# Patient Record
Sex: MALE | Race: WHITE | NOT HISPANIC OR LATINO | Employment: FULL TIME | URBAN - METROPOLITAN AREA
[De-identification: names, ages, dates, MRNs, and addresses within clinical notes are randomized per-mention and may not be internally consistent; named-entity substitution may affect disease eponyms.]

---

## 2017-07-10 ENCOUNTER — COMMUNICATION - HEALTHEAST (OUTPATIENT)
Dept: FAMILY MEDICINE | Facility: CLINIC | Age: 52
End: 2017-07-10

## 2017-07-10 DIAGNOSIS — J45.909 ASTHMA: ICD-10-CM

## 2017-07-19 ENCOUNTER — COMMUNICATION - HEALTHEAST (OUTPATIENT)
Dept: TELEHEALTH | Facility: CLINIC | Age: 52
End: 2017-07-19

## 2017-08-14 ENCOUNTER — RECORDS - HEALTHEAST (OUTPATIENT)
Dept: ADMINISTRATIVE | Facility: OTHER | Age: 52
End: 2017-08-14

## 2017-08-15 ENCOUNTER — RECORDS - HEALTHEAST (OUTPATIENT)
Dept: ADMINISTRATIVE | Facility: OTHER | Age: 52
End: 2017-08-15

## 2017-08-22 ENCOUNTER — COMMUNICATION - HEALTHEAST (OUTPATIENT)
Dept: FAMILY MEDICINE | Facility: CLINIC | Age: 52
End: 2017-08-22

## 2017-11-13 ENCOUNTER — OFFICE VISIT - HEALTHEAST (OUTPATIENT)
Dept: FAMILY MEDICINE | Facility: CLINIC | Age: 52
End: 2017-11-13

## 2017-11-13 ENCOUNTER — COMMUNICATION - HEALTHEAST (OUTPATIENT)
Dept: FAMILY MEDICINE | Facility: CLINIC | Age: 52
End: 2017-11-13

## 2017-11-13 DIAGNOSIS — J45.909 ASTHMA: ICD-10-CM

## 2017-11-13 DIAGNOSIS — E78.5 HYPERLIPIDEMIA: ICD-10-CM

## 2017-11-13 DIAGNOSIS — R74.8 ELEVATED LIVER ENZYMES: ICD-10-CM

## 2017-11-13 DIAGNOSIS — Z00.00 HEALTH CARE MAINTENANCE: ICD-10-CM

## 2017-11-13 DIAGNOSIS — Z12.5 SCREENING PSA (PROSTATE SPECIFIC ANTIGEN): ICD-10-CM

## 2017-11-13 DIAGNOSIS — E66.9 OBESITY: ICD-10-CM

## 2017-11-13 LAB
CHOLEST SERPL-MCNC: 164 MG/DL
FASTING STATUS PATIENT QL REPORTED: YES
HDLC SERPL-MCNC: 47 MG/DL
LDLC SERPL CALC-MCNC: 102 MG/DL
PSA SERPL-MCNC: 0.6 NG/ML (ref 0–3.5)
TRIGL SERPL-MCNC: 73 MG/DL

## 2018-04-18 ENCOUNTER — COMMUNICATION - HEALTHEAST (OUTPATIENT)
Dept: FAMILY MEDICINE | Facility: CLINIC | Age: 53
End: 2018-04-18

## 2018-04-18 DIAGNOSIS — J45.909 ASTHMA: ICD-10-CM

## 2018-06-05 ENCOUNTER — COMMUNICATION - HEALTHEAST (OUTPATIENT)
Dept: FAMILY MEDICINE | Facility: CLINIC | Age: 53
End: 2018-06-05

## 2018-11-23 ENCOUNTER — COMMUNICATION - HEALTHEAST (OUTPATIENT)
Dept: FAMILY MEDICINE | Facility: CLINIC | Age: 53
End: 2018-11-23

## 2018-11-23 DIAGNOSIS — E78.5 HYPERLIPIDEMIA: ICD-10-CM

## 2018-11-23 DIAGNOSIS — J45.909 ASTHMA: ICD-10-CM

## 2018-12-07 ENCOUNTER — OFFICE VISIT - HEALTHEAST (OUTPATIENT)
Dept: FAMILY MEDICINE | Facility: CLINIC | Age: 53
End: 2018-12-07

## 2018-12-07 DIAGNOSIS — Z12.5 SCREENING PSA (PROSTATE SPECIFIC ANTIGEN): ICD-10-CM

## 2018-12-07 DIAGNOSIS — R03.0 ELEVATED BP WITHOUT DIAGNOSIS OF HYPERTENSION: ICD-10-CM

## 2018-12-07 DIAGNOSIS — E66.09 CLASS 2 OBESITY DUE TO EXCESS CALORIES WITHOUT SERIOUS COMORBIDITY WITH BODY MASS INDEX (BMI) OF 36.0 TO 36.9 IN ADULT: ICD-10-CM

## 2018-12-07 DIAGNOSIS — E78.5 HYPERLIPIDEMIA: ICD-10-CM

## 2018-12-07 DIAGNOSIS — Z00.00 HEALTH CARE MAINTENANCE: ICD-10-CM

## 2018-12-07 DIAGNOSIS — E66.812 CLASS 2 OBESITY DUE TO EXCESS CALORIES WITHOUT SERIOUS COMORBIDITY WITH BODY MASS INDEX (BMI) OF 36.0 TO 36.9 IN ADULT: ICD-10-CM

## 2018-12-07 DIAGNOSIS — J45.909 ASTHMA: ICD-10-CM

## 2018-12-07 LAB
ALBUMIN SERPL-MCNC: 4.3 G/DL (ref 3.5–5)
ALP SERPL-CCNC: 79 U/L (ref 45–120)
ALT SERPL W P-5'-P-CCNC: 44 U/L (ref 0–45)
ANION GAP SERPL CALCULATED.3IONS-SCNC: 15 MMOL/L (ref 5–18)
AST SERPL W P-5'-P-CCNC: 29 U/L (ref 0–40)
BILIRUB SERPL-MCNC: 0.7 MG/DL (ref 0–1)
BUN SERPL-MCNC: 16 MG/DL (ref 8–22)
CALCIUM SERPL-MCNC: 9.6 MG/DL (ref 8.5–10.5)
CHLORIDE BLD-SCNC: 103 MMOL/L (ref 98–107)
CHOLEST SERPL-MCNC: 209 MG/DL
CO2 SERPL-SCNC: 23 MMOL/L (ref 22–31)
CREAT SERPL-MCNC: 0.8 MG/DL (ref 0.7–1.3)
FASTING STATUS PATIENT QL REPORTED: YES
GFR SERPL CREATININE-BSD FRML MDRD: >60 ML/MIN/1.73M2
GLUCOSE BLD-MCNC: 74 MG/DL (ref 70–125)
HDLC SERPL-MCNC: 49 MG/DL
LDLC SERPL CALC-MCNC: 140 MG/DL
POTASSIUM BLD-SCNC: 4.3 MMOL/L (ref 3.5–5)
PROT SERPL-MCNC: 7.8 G/DL (ref 6–8)
SODIUM SERPL-SCNC: 141 MMOL/L (ref 136–145)
TRIGL SERPL-MCNC: 99 MG/DL

## 2018-12-10 ENCOUNTER — COMMUNICATION - HEALTHEAST (OUTPATIENT)
Dept: FAMILY MEDICINE | Facility: CLINIC | Age: 53
End: 2018-12-10

## 2018-12-21 ENCOUNTER — AMBULATORY - HEALTHEAST (OUTPATIENT)
Dept: NURSING | Facility: CLINIC | Age: 53
End: 2018-12-21

## 2018-12-21 ENCOUNTER — AMBULATORY - HEALTHEAST (OUTPATIENT)
Dept: LAB | Facility: CLINIC | Age: 53
End: 2018-12-21

## 2018-12-21 DIAGNOSIS — R03.0 ELEVATED BLOOD PRESSURE READING WITHOUT DIAGNOSIS OF HYPERTENSION: ICD-10-CM

## 2018-12-21 DIAGNOSIS — Z12.5 SCREENING PSA (PROSTATE SPECIFIC ANTIGEN): ICD-10-CM

## 2018-12-21 LAB — PSA SERPL-MCNC: 0.7 NG/ML (ref 0–3.5)

## 2018-12-22 ENCOUNTER — COMMUNICATION - HEALTHEAST (OUTPATIENT)
Dept: FAMILY MEDICINE | Facility: CLINIC | Age: 53
End: 2018-12-22

## 2019-01-09 ENCOUNTER — OFFICE VISIT - HEALTHEAST (OUTPATIENT)
Dept: FAMILY MEDICINE | Facility: CLINIC | Age: 54
End: 2019-01-09

## 2019-01-09 DIAGNOSIS — I10 ESSENTIAL HYPERTENSION: ICD-10-CM

## 2019-01-18 ENCOUNTER — AMBULATORY - HEALTHEAST (OUTPATIENT)
Dept: NURSING | Facility: CLINIC | Age: 54
End: 2019-01-18

## 2019-02-06 ENCOUNTER — OFFICE VISIT - HEALTHEAST (OUTPATIENT)
Dept: FAMILY MEDICINE | Facility: CLINIC | Age: 54
End: 2019-02-06

## 2019-02-06 DIAGNOSIS — I10 ESSENTIAL HYPERTENSION: ICD-10-CM

## 2019-02-06 LAB
ANION GAP SERPL CALCULATED.3IONS-SCNC: 12 MMOL/L (ref 5–18)
BUN SERPL-MCNC: 17 MG/DL (ref 8–22)
CALCIUM SERPL-MCNC: 9 MG/DL (ref 8.5–10.5)
CHLORIDE BLD-SCNC: 103 MMOL/L (ref 98–107)
CO2 SERPL-SCNC: 24 MMOL/L (ref 22–31)
CREAT SERPL-MCNC: 0.83 MG/DL (ref 0.7–1.3)
GFR SERPL CREATININE-BSD FRML MDRD: >60 ML/MIN/1.73M2
GLUCOSE BLD-MCNC: 86 MG/DL (ref 70–125)
POTASSIUM BLD-SCNC: 4.1 MMOL/L (ref 3.5–5)
SODIUM SERPL-SCNC: 139 MMOL/L (ref 136–145)

## 2019-02-06 ASSESSMENT — MIFFLIN-ST. JEOR: SCORE: 2083.64

## 2019-02-07 ENCOUNTER — COMMUNICATION - HEALTHEAST (OUTPATIENT)
Dept: FAMILY MEDICINE | Facility: CLINIC | Age: 54
End: 2019-02-07

## 2019-02-20 ENCOUNTER — AMBULATORY - HEALTHEAST (OUTPATIENT)
Dept: NURSING | Facility: CLINIC | Age: 54
End: 2019-02-20

## 2019-02-20 DIAGNOSIS — I10 ESSENTIAL HYPERTENSION: ICD-10-CM

## 2019-02-27 ENCOUNTER — OFFICE VISIT - HEALTHEAST (OUTPATIENT)
Dept: FAMILY MEDICINE | Facility: CLINIC | Age: 54
End: 2019-02-27

## 2019-02-27 DIAGNOSIS — I10 ESSENTIAL HYPERTENSION: ICD-10-CM

## 2019-02-27 LAB
ANION GAP SERPL CALCULATED.3IONS-SCNC: 12 MMOL/L (ref 5–18)
BUN SERPL-MCNC: 17 MG/DL (ref 8–22)
CALCIUM SERPL-MCNC: 9.4 MG/DL (ref 8.5–10.5)
CHLORIDE BLD-SCNC: 100 MMOL/L (ref 98–107)
CO2 SERPL-SCNC: 27 MMOL/L (ref 22–31)
CREAT SERPL-MCNC: 0.85 MG/DL (ref 0.7–1.3)
GFR SERPL CREATININE-BSD FRML MDRD: >60 ML/MIN/1.73M2
GLUCOSE BLD-MCNC: 92 MG/DL (ref 70–125)
POTASSIUM BLD-SCNC: 4.4 MMOL/L (ref 3.5–5)
SODIUM SERPL-SCNC: 139 MMOL/L (ref 136–145)

## 2019-02-28 ENCOUNTER — COMMUNICATION - HEALTHEAST (OUTPATIENT)
Dept: FAMILY MEDICINE | Facility: CLINIC | Age: 54
End: 2019-02-28

## 2019-03-18 ENCOUNTER — COMMUNICATION - HEALTHEAST (OUTPATIENT)
Dept: FAMILY MEDICINE | Facility: CLINIC | Age: 54
End: 2019-03-18

## 2019-03-22 ENCOUNTER — AMBULATORY - HEALTHEAST (OUTPATIENT)
Dept: NURSING | Facility: CLINIC | Age: 54
End: 2019-03-22

## 2019-03-22 DIAGNOSIS — I10 ESSENTIAL HYPERTENSION: ICD-10-CM

## 2019-04-01 ENCOUNTER — OFFICE VISIT - HEALTHEAST (OUTPATIENT)
Dept: FAMILY MEDICINE | Facility: CLINIC | Age: 54
End: 2019-04-01

## 2019-04-01 DIAGNOSIS — I10 ESSENTIAL HYPERTENSION: ICD-10-CM

## 2019-04-01 DIAGNOSIS — T63.441D BEE STING REACTION, ACCIDENTAL OR UNINTENTIONAL, SUBSEQUENT ENCOUNTER: ICD-10-CM

## 2019-04-01 DIAGNOSIS — E56.9 VITAMIN DEFICIENCY: ICD-10-CM

## 2019-04-01 LAB
ANION GAP SERPL CALCULATED.3IONS-SCNC: 14 MMOL/L (ref 5–18)
BUN SERPL-MCNC: 24 MG/DL (ref 8–22)
CALCIUM SERPL-MCNC: 9.2 MG/DL (ref 8.5–10.5)
CHLORIDE BLD-SCNC: 102 MMOL/L (ref 98–107)
CO2 SERPL-SCNC: 22 MMOL/L (ref 22–31)
CREAT SERPL-MCNC: 0.86 MG/DL (ref 0.7–1.3)
GFR SERPL CREATININE-BSD FRML MDRD: >60 ML/MIN/1.73M2
GLUCOSE BLD-MCNC: 146 MG/DL (ref 70–125)
POTASSIUM BLD-SCNC: 4 MMOL/L (ref 3.5–5)
SODIUM SERPL-SCNC: 138 MMOL/L (ref 136–145)

## 2019-04-01 RX ORDER — EPINEPHRINE 0.3 MG/.3ML
0.3 INJECTION SUBCUTANEOUS PRN
Qty: 2 | Refills: 0 | Status: SHIPPED | OUTPATIENT
Start: 2019-04-01 | End: 2024-01-30

## 2019-04-02 ENCOUNTER — AMBULATORY - HEALTHEAST (OUTPATIENT)
Dept: FAMILY MEDICINE | Facility: CLINIC | Age: 54
End: 2019-04-02

## 2019-04-02 ENCOUNTER — COMMUNICATION - HEALTHEAST (OUTPATIENT)
Dept: FAMILY MEDICINE | Facility: CLINIC | Age: 54
End: 2019-04-02

## 2019-04-02 DIAGNOSIS — E55.9 VITAMIN D DEFICIENCY: ICD-10-CM

## 2019-04-02 LAB
25(OH)D3 SERPL-MCNC: 6 NG/ML (ref 30–80)
25(OH)D3 SERPL-MCNC: 6 NG/ML (ref 30–80)

## 2019-05-20 ENCOUNTER — COMMUNICATION - HEALTHEAST (OUTPATIENT)
Dept: FAMILY MEDICINE | Facility: CLINIC | Age: 54
End: 2019-05-20

## 2019-05-20 DIAGNOSIS — I10 ESSENTIAL HYPERTENSION: ICD-10-CM

## 2019-06-06 ENCOUNTER — COMMUNICATION - HEALTHEAST (OUTPATIENT)
Dept: SCHEDULING | Facility: CLINIC | Age: 54
End: 2019-06-06

## 2019-07-24 ENCOUNTER — AMBULATORY - HEALTHEAST (OUTPATIENT)
Dept: LAB | Facility: CLINIC | Age: 54
End: 2019-07-24

## 2019-07-24 DIAGNOSIS — E55.9 VITAMIN D DEFICIENCY: ICD-10-CM

## 2019-07-25 ENCOUNTER — COMMUNICATION - HEALTHEAST (OUTPATIENT)
Dept: FAMILY MEDICINE | Facility: CLINIC | Age: 54
End: 2019-07-25

## 2019-07-25 LAB
25(OH)D3 SERPL-MCNC: 39.9 NG/ML (ref 30–80)
25(OH)D3 SERPL-MCNC: 39.9 NG/ML (ref 30–80)

## 2019-09-12 ENCOUNTER — COMMUNICATION - HEALTHEAST (OUTPATIENT)
Dept: FAMILY MEDICINE | Facility: CLINIC | Age: 54
End: 2019-09-12

## 2019-09-12 DIAGNOSIS — J45.909 ASTHMA: ICD-10-CM

## 2019-10-30 ENCOUNTER — COMMUNICATION - HEALTHEAST (OUTPATIENT)
Dept: FAMILY MEDICINE | Facility: CLINIC | Age: 54
End: 2019-10-30

## 2019-10-30 DIAGNOSIS — I10 ESSENTIAL HYPERTENSION: ICD-10-CM

## 2019-11-22 ENCOUNTER — OFFICE VISIT - HEALTHEAST (OUTPATIENT)
Dept: FAMILY MEDICINE | Facility: CLINIC | Age: 54
End: 2019-11-22

## 2019-11-22 DIAGNOSIS — E78.5 HYPERLIPIDEMIA: ICD-10-CM

## 2019-11-22 DIAGNOSIS — I10 ESSENTIAL HYPERTENSION: ICD-10-CM

## 2019-11-22 DIAGNOSIS — Z12.5 SCREENING PSA (PROSTATE SPECIFIC ANTIGEN): ICD-10-CM

## 2019-11-22 DIAGNOSIS — E66.812 CLASS 2 DRUG-INDUCED OBESITY WITHOUT SERIOUS COMORBIDITY WITH BODY MASS INDEX (BMI) OF 35.0 TO 35.9 IN ADULT: ICD-10-CM

## 2019-11-22 DIAGNOSIS — J45.31 MILD PERSISTENT ASTHMA WITH EXACERBATION: ICD-10-CM

## 2019-11-22 DIAGNOSIS — E66.1 CLASS 2 DRUG-INDUCED OBESITY WITHOUT SERIOUS COMORBIDITY WITH BODY MASS INDEX (BMI) OF 35.0 TO 35.9 IN ADULT: ICD-10-CM

## 2019-11-22 DIAGNOSIS — Z23 NEED FOR SHINGLES VACCINE: ICD-10-CM

## 2019-11-22 DIAGNOSIS — E55.9 VITAMIN D DEFICIENCY: ICD-10-CM

## 2019-11-22 DIAGNOSIS — R73.9 ELEVATED BLOOD SUGAR: ICD-10-CM

## 2019-11-22 LAB
ALBUMIN SERPL-MCNC: 4.4 G/DL (ref 3.5–5)
ALP SERPL-CCNC: 68 U/L (ref 45–120)
ALT SERPL W P-5'-P-CCNC: 56 U/L (ref 0–45)
ANION GAP SERPL CALCULATED.3IONS-SCNC: 14 MMOL/L (ref 5–18)
AST SERPL W P-5'-P-CCNC: 30 U/L (ref 0–40)
BILIRUB SERPL-MCNC: 0.7 MG/DL (ref 0–1)
BUN SERPL-MCNC: 18 MG/DL (ref 8–22)
CALCIUM SERPL-MCNC: 9.7 MG/DL (ref 8.5–10.5)
CHLORIDE BLD-SCNC: 99 MMOL/L (ref 98–107)
CHOLEST SERPL-MCNC: 199 MG/DL
CO2 SERPL-SCNC: 25 MMOL/L (ref 22–31)
CREAT SERPL-MCNC: 0.86 MG/DL (ref 0.7–1.3)
FASTING STATUS PATIENT QL REPORTED: YES
GFR SERPL CREATININE-BSD FRML MDRD: >60 ML/MIN/1.73M2
GLUCOSE BLD-MCNC: 85 MG/DL (ref 70–125)
HBA1C MFR BLD: 5.7 % (ref 3.5–6)
HDLC SERPL-MCNC: 47 MG/DL
LDLC SERPL CALC-MCNC: 124 MG/DL
POTASSIUM BLD-SCNC: 4.1 MMOL/L (ref 3.5–5)
PROT SERPL-MCNC: 8.1 G/DL (ref 6–8)
PSA SERPL-MCNC: 0.6 NG/ML (ref 0–3.5)
SODIUM SERPL-SCNC: 138 MMOL/L (ref 136–145)
TRIGL SERPL-MCNC: 142 MG/DL

## 2019-11-22 ASSESSMENT — MIFFLIN-ST. JEOR: SCORE: 2077.86

## 2019-11-25 LAB
25(OH)D3 SERPL-MCNC: 44.9 NG/ML (ref 30–80)
25(OH)D3 SERPL-MCNC: 44.9 NG/ML (ref 30–80)

## 2019-11-28 ENCOUNTER — COMMUNICATION - HEALTHEAST (OUTPATIENT)
Dept: FAMILY MEDICINE | Facility: CLINIC | Age: 54
End: 2019-11-28

## 2019-12-10 ENCOUNTER — AMBULATORY - HEALTHEAST (OUTPATIENT)
Dept: NURSING | Facility: CLINIC | Age: 54
End: 2019-12-10

## 2019-12-10 DIAGNOSIS — I10 ESSENTIAL HYPERTENSION: ICD-10-CM

## 2019-12-18 ENCOUNTER — COMMUNICATION - HEALTHEAST (OUTPATIENT)
Dept: FAMILY MEDICINE | Facility: CLINIC | Age: 54
End: 2019-12-18

## 2020-01-06 ENCOUNTER — OFFICE VISIT - HEALTHEAST (OUTPATIENT)
Dept: FAMILY MEDICINE | Facility: CLINIC | Age: 55
End: 2020-01-06

## 2020-01-06 DIAGNOSIS — E78.5 HYPERLIPIDEMIA, UNSPECIFIED HYPERLIPIDEMIA TYPE: ICD-10-CM

## 2020-01-06 DIAGNOSIS — I10 ESSENTIAL HYPERTENSION: ICD-10-CM

## 2020-01-06 DIAGNOSIS — I21.A9 OTHER TYPE OF MYOCARDIAL INFARCTION (H): ICD-10-CM

## 2020-01-06 LAB
ANION GAP SERPL CALCULATED.3IONS-SCNC: 13 MMOL/L (ref 5–18)
BUN SERPL-MCNC: 24 MG/DL (ref 8–22)
CALCIUM SERPL-MCNC: 10 MG/DL (ref 8.5–10.5)
CHLORIDE BLD-SCNC: 100 MMOL/L (ref 98–107)
CO2 SERPL-SCNC: 23 MMOL/L (ref 22–31)
CREAT SERPL-MCNC: 0.87 MG/DL (ref 0.7–1.3)
GFR SERPL CREATININE-BSD FRML MDRD: >60 ML/MIN/1.73M2
GLUCOSE BLD-MCNC: 89 MG/DL (ref 70–125)
POTASSIUM BLD-SCNC: 4 MMOL/L (ref 3.5–5)
SODIUM SERPL-SCNC: 136 MMOL/L (ref 136–145)

## 2020-01-07 ENCOUNTER — COMMUNICATION - HEALTHEAST (OUTPATIENT)
Dept: FAMILY MEDICINE | Facility: CLINIC | Age: 55
End: 2020-01-07

## 2020-01-24 ENCOUNTER — AMBULATORY - HEALTHEAST (OUTPATIENT)
Dept: NURSING | Facility: CLINIC | Age: 55
End: 2020-01-24

## 2020-02-19 ENCOUNTER — COMMUNICATION - HEALTHEAST (OUTPATIENT)
Dept: FAMILY MEDICINE | Facility: CLINIC | Age: 55
End: 2020-02-19

## 2020-02-26 ENCOUNTER — OFFICE VISIT - HEALTHEAST (OUTPATIENT)
Dept: FAMILY MEDICINE | Facility: CLINIC | Age: 55
End: 2020-02-26

## 2020-02-26 ENCOUNTER — COMMUNICATION - HEALTHEAST (OUTPATIENT)
Dept: FAMILY MEDICINE | Facility: CLINIC | Age: 55
End: 2020-02-26

## 2020-02-26 DIAGNOSIS — M10.9 ACUTE GOUT INVOLVING TOE OF RIGHT FOOT, UNSPECIFIED CAUSE: ICD-10-CM

## 2020-02-26 DIAGNOSIS — I10 ESSENTIAL HYPERTENSION: ICD-10-CM

## 2020-02-26 DIAGNOSIS — J06.9 VIRAL UPPER RESPIRATORY TRACT INFECTION: ICD-10-CM

## 2020-02-26 LAB — URATE SERPL-MCNC: 7.9 MG/DL (ref 3–8)

## 2020-03-13 ENCOUNTER — AMBULATORY - HEALTHEAST (OUTPATIENT)
Dept: NURSING | Facility: CLINIC | Age: 55
End: 2020-03-13

## 2020-03-25 ENCOUNTER — COMMUNICATION - HEALTHEAST (OUTPATIENT)
Dept: FAMILY MEDICINE | Facility: CLINIC | Age: 55
End: 2020-03-25

## 2020-03-25 DIAGNOSIS — J45.909 ASTHMA: ICD-10-CM

## 2020-05-12 ENCOUNTER — COMMUNICATION - HEALTHEAST (OUTPATIENT)
Dept: FAMILY MEDICINE | Facility: CLINIC | Age: 55
End: 2020-05-12

## 2020-05-13 ENCOUNTER — AMBULATORY - HEALTHEAST (OUTPATIENT)
Dept: FAMILY MEDICINE | Facility: CLINIC | Age: 55
End: 2020-05-13

## 2020-05-13 DIAGNOSIS — Z71.84 COUNSELING FOR TRAVEL: ICD-10-CM

## 2020-05-13 DIAGNOSIS — Z00.00 HEALTH CARE MAINTENANCE: ICD-10-CM

## 2020-06-17 ENCOUNTER — VIRTUAL VISIT (OUTPATIENT)
Dept: FAMILY MEDICINE | Facility: OTHER | Age: 55
End: 2020-06-17

## 2020-06-17 DIAGNOSIS — Z20.822 SUSPECTED 2019 NOVEL CORONAVIRUS INFECTION: Primary | ICD-10-CM

## 2020-06-17 PROCEDURE — U0003 INFECTIOUS AGENT DETECTION BY NUCLEIC ACID (DNA OR RNA); SEVERE ACUTE RESPIRATORY SYNDROME CORONAVIRUS 2 (SARS-COV-2) (CORONAVIRUS DISEASE [COVID-19]), AMPLIFIED PROBE TECHNIQUE, MAKING USE OF HIGH THROUGHPUT TECHNOLOGIES AS DESCRIBED BY CMS-2020-01-R: HCPCS | Mod: 90 | Performed by: FAMILY MEDICINE

## 2020-06-17 PROCEDURE — 99000 SPECIMEN HANDLING OFFICE-LAB: CPT | Performed by: FAMILY MEDICINE

## 2020-06-17 NOTE — LETTER
June 18, 2020        Ulices Gilbert  [address]  [place] [facility] [address]    This letter provides a written record that you were tested for COVID-19 on 6/17/20.       Your result was negative. This means that we didn t find the virus that causes COVID-19 in your sample. A test may show negative when you do actually have the virus. This can happen when the virus is in the early stages of infection, before you feel illness symptoms.    If you have symptoms   Stay home and away from others (self-isolate) until you meet ALL of the guidelines below:    You ve had no fever--and no medicine that reduces fever--for 3 full days (72 hours). And      Your other symptoms have gotten better. For example, your cough or breathing has improved. And     At least 10 days have passed since your symptoms started.    During this time:    Stay home. Don t go to work, school or anywhere else.     Stay in your own room, including for meals. Use your own bathroom if you can.    Stay away from others in your home. No hugging, kissing or shaking hands. No visitors.    Clean  high touch  surfaces often (doorknobs, counters, handles, etc.). Use a household cleaning spray or wipes. You can find a full list on the EPA website at www.epa.gov/pesticide-registration/list-n-disinfectants-use-against-sars-cov-2.    Cover your mouth and nose with a mask, tissue or washcloth to avoid spreading germs.    Wash your hands and face often with soap and water.    Going back to work  Check with your employer for any guidelines to follow for going back to work.    Employers: This document serves as formal notice that your employee tested negative for COVID-19, as of the testing date shown above.

## 2020-06-18 LAB
SARS-COV-2 RNA SPEC QL NAA+PROBE: NOT DETECTED
SPECIMEN SOURCE: NORMAL

## 2020-06-18 NOTE — PROGRESS NOTES
"Date: 2020 10:18:28  Clinician: Jessica Ash  Clinician NPI: [id number]  Patient: Ulices Gilbert  Patient : 1965  Patient Address: [address] [place], [place] [address]  Patient Phone: [phone]  Visit Protocol: URI  Patient Summary:  Ulices is a 55 year old ( : 1965 ) male who initiated a Visit for COVID-19 (Coronavirus) evaluation and screening. When asked the question \"Please sign me up to receive news, health information and promotions from [employer].\", Ulices responded \"No\".    Ulices states his symptoms started today.   His symptoms consist of nausea, diarrhea, a sore throat, enlarged lymph nodes, malaise, myalgia, a cough, and chills.   Symptom details     Cough: Ulices coughs every 5-10 minutes and his cough is more bothersome at night. Phlegm does not come into his throat when he coughs. He does not believe his cough is caused by post-nasal drip.     Sore throat: Ulices reports having severe throat pain (7-9 on a 10 point pain scale), does not have exudate on his tonsils, and can swallow liquids. The lymph nodes in his neck are enlarged. A rash has not appeared on the skin since the sore throat started.      Ulices denies having wheezing, teeth pain, ageusia, anosmia, facial pain or pressure, fever, nasal congestion, vomiting, rhinitis, ear pain, and headache. He also denies having recent facial or sinus surgery in the past 60 days and taking antibiotic medication for the symptoms.   Precipitating events  Within the past week, Ulices has not been exposed to someone with strep throat. He has not recently been exposed to someone with influenza. Ulices has not been in close contact with any high risk individuals.   Pertinent COVID-19 (Coronavirus) information  In the past 14 days, Ulices has not worked in a congregate living setting.   He does not work or volunteer as healthcare worker or a  and does not work or volunteer in a healthcare facility.   Ulices also has not lived in a " congregate living setting in the past 14 days. He does not live with a healthcare worker.   Ulices has not had a close contact with a laboratory-confirmed COVID-19 patient within 14 days of symptom onset.   Triage Point(s) temporarily suspended for COVID-19 (Coronavirus) screening  Ulices reported the following symptoms which were previously protocol referral points. These protocol referral points have temporarily been removed for purposes of COVID-19 (Coronavirus) screening.   Difficulty breathing even when resting and can only speak in phrase(s)   Pertinent medical history  Ulices does not need a return to work/school note.   Weight: 237 lbs   Ulices does not smoke or use smokeless tobacco.   Additional information as reported by the patient (free text): at protest last Saturday   Weight: 237 lbs    MEDICATIONS: simvastatin oral, lisinopril oral, Pulmicort Flexhaler inhalation, ALLERGIES: NKDA  Clinician Response:  Dear Ulices,   Your symptoms show that you may have coronavirus (COVID-19). This illness can cause fever, cough and trouble breathing. Many people get a mild case and get better on their own. Some people can get very sick.  Based on the symptoms you have shared, I would like you to be re-checked in 2 to 3 days. Please call your family clinic to set up a video or phone visit.  Will I be tested for COVID-19?  We would like to test you for this virus.   Please call [phone] to schedule your visit. Explain that you were referred by [facility] to have a COVID-19 test. Be ready to [facility].   The following will serve as your written order for this COVID Test, ordered by me, for the indication of suspected COVID [Z20.828]: The test will be ordered in [employer], our electronic health record, after you are scheduled. It will show as ordered and authorized by Cecilio Moreno MD.  Order: COVID-19 (Coronavirus) PCR for SYMPTOMATIC testing from [facility].  1.When it's time for your COVID test:   Stay at least 6  "feet away from others. (If someone will drive you to your test, stay in the backseat, as far away from the  as you can.)   Cover your mouth and nose with a mask, tissue or washcloth.  Go straight to the testing site. Don't make any stops on the way there or back.      2.Starting now: Stay home and away from others (self-isolate) until:   You've had no fever---and no medicine that reduces fever---for 3 full days (72 hours). And...   Your other symptoms have gotten better. For example, your cough or breathing has improved. And...   At least 10 days have passed since your symptoms started.       During this time, don't leave the house except for testing or medical care.   Stay in your own room, even for meals. Use your own bathroom if you can.   Stay away from others in your home. No hugging, kissing or shaking hands. No visitors.  Don't go to work, school or anywhere else.    Clean \"high touch\" surfaces often (doorknobs, counters, handles, etc.). Use a household cleaning spray or wipes. You'll find a full list of  on the EPA website: www.epa.gov/pesticide-registration/list-n-disinfectants-use-against-sars-cov-2.   Cover your mouth and nose with a mask, tissue or washcloth to avoid spreading germs.  Wash your hands and face often. Use soap and water.  Caregivers in these groups are at risk for severe illness due to COVID-19:  o People 65 years and older  o People who live in a nursing home or long-term care facility  o People with chronic disease (lung, heart, cancer, diabetes, kidney, liver, immunologic)   o People who have a weakened immune system, including those who:   Are in cancer treatment  Take medicine that weakens the immune system, such as corticosteroids  Had a bone marrow or organ transplant  Have an immune deficiency  Have poorly controlled HIV or AIDS  Are obese (body mass index of 40 or higher)  Smoke regularly   o Caregivers should wear gloves while washing dishes, handling laundry and " cleaning bedrooms and bathrooms.  o Use caution when washing and drying laundry: Don't shake dirty laundry, and use the warmest water setting that you can.  o For more tips, go to www.cdc.gov/coronavirus/2019-ncov/downloads/10Things.pdf.      How can I take care of myself?   Get lots of rest. Drink extra fluids (unless a doctor has told you not to)   Take Tylenol (acetaminophen) for fever or pain. If you have liver or kidney problems, ask your family doctor if it's okay to take Tylenol.   Adults can take either:    650 mg (two 325 mg pills) every 4 to 6 hours, or...   1,000 mg (two 500 mg pills) every 8 hours as needed.    Note: Don't take more than 3,000 mg in one day. Acetaminophen is found in many medicines (both prescribed and over-the-counter medicines). Read all labels to be sure you don't take too much.   For children, check the Tylenol bottle for the right dose. The dose is based on the child's age or weight.    If you have other health problems (like cancer, heart failure, an organ transplant or severe kidney disease): Call your specialty clinic if you don't feel better in the next 2 days.       Know when to call 911. Emergency warning signs include:    Trouble breathing or shortness of breath Pain or pressure in the chest that doesn't go away Feeling confused like you haven't felt before, or not being able to wake up Bluish-colored lips or face  Where can I get more information?   [facility] -- About COVID-19: [email or web address]   CDC -- What to Do If You're Sick: www.cdc.gov/coronavirus/2019-ncov/about/steps-when-sick.html   CDC -- Ending Home Isolation: www.cdc.gov/coronavirus/2019-ncov/hcp/disposition-in-home-patients.html   CDC -- Caring for Someone: www.cdc.gov/coronavirus/2019-ncov/if-you-are-sick/care-for-someone.html   [facility] -- Interim Guidance for Hospital Discharge to Home: www.health.[facility].mn./diseases/coronavirus/hcp/hospdischarge.pdf   [facility]  trials (COVID-19 research studies): clinicalaffairs.[facility]/n-clinical-trials    Below are the COVID-19 hotlines at the [place] Department of Health ([facility]). Interpreters are available.    For health questions: Call 804-507-8449 or 1-259.129.6321 (7 a.m. to 7 p.m.) For questions about schools and childcare: Call 314-867-5045 or 1-568.807.4889 (7 a.m. to 7 p.m.)       Diagnosis: Cough  Diagnosis ICD: R05  Prescription: benzonatate (Tessalon Perles) 100 mg oral capsule 21 capsule, 7 days supply. Take 1 capsule by mouth 3 times per day for 7 days as needed. Refills: 0, Refill as needed: no, Allow substitutions: yes

## 2020-07-21 ENCOUNTER — NURSE TRIAGE (OUTPATIENT)
Dept: NURSING | Facility: CLINIC | Age: 55
End: 2020-07-21

## 2020-07-21 DIAGNOSIS — Z20.822 EXPOSURE TO COVID-19 VIRUS: Primary | ICD-10-CM

## 2020-07-21 NOTE — TELEPHONE ENCOUNTER
"Patient is calling requesting COVID serologic antibody testing.  NOTE: Serologic testing is a blood test for 'antibodies' which are made at 10-14 days after you have had symptoms of COVID or were exposed and had an asymptomatic infection.  This does NOT test you for 'active' infection or tell you if you are contagious.    Are you a healthcare worker? no  Do you currently have a cough, fever, body aches, shortness of breath, or difficulty breathing?  No  Did you previously have cough, fever, body aches, shortness of breath, or difficulty breathing that have now resolved? Has had previous covid symptoms.   Symptoms began 100 days ago.  Symptoms started > 14 days ago. Lab order placed per SARS-CoV-2 Serology test Standing Order using indication \"Previously symptomatic >14d since onset, currently asymptomatic\" and diagnosis code \"Screening for viral disease\" (Z11.59)      The patient was informed: \"Testing is limited each day and it may take time for testing to be available to everyone who has called. You will receive a call within 48-72 hours to schedule the serology testing. Please confirm the best number to reach you is [phone]. If you have any questions about scheduling, call [phone].\"     Gloria Taylor RN on 7/21/2020 at 2:16 PM      "

## 2020-07-29 ENCOUNTER — VIRTUAL VISIT (OUTPATIENT)
Dept: FAMILY MEDICINE | Facility: OTHER | Age: 55
End: 2020-07-29
Payer: COMMERCIAL

## 2020-07-29 PROCEDURE — 99421 OL DIG E/M SVC 5-10 MIN: CPT | Performed by: FAMILY MEDICINE

## 2020-07-29 NOTE — PROGRESS NOTES
"Date: 2020 17:48:28  Clinician: Cristopher Brink  Clinician NPI: [id number]  Patient: Ulices Gilbert  Patient : 1965  Patient Address: [address] [place], [place] [address]  Patient Phone: [phone]  Visit Protocol: URI  Patient Summary:  Ulices is a 55 year old ( : 1965 ) male who initiated a Visit for COVID-19 (Coronavirus) evaluation and screening. When asked the question \"Please sign me up to receive news, health information and promotions from [facility].\", Ulices responded \"No\".    When asked when his symptoms started, Ulices reported that he does not have any symptoms.   He denies having recent facial or sinus surgery in the past 60 days and taking antibiotic medication in the past month.    Pertinent COVID-19 (Coronavirus) information  In the past 14 days, Ulices has not worked in a congregate living setting.   He does not work or volunteer as healthcare worker or a  and does not work or volunteer in a healthcare facility.   Ulices also has not lived in a congregate living setting in the past 14 days. He does not live with a healthcare worker.   Ulices has had a close contact with a laboratory-confirmed COVID-19 patient in the last 14 days. Additional information about contact with COVID-19 (Coronavirus) patient as reported by the patient (free text): At work we have one tested positive several others showing symptoms.  [employer] is closed, everyone has been instructed to get tested.   Pertinent medical history  Ulicse does not need a return to work/school note.   Weight: 237 lbs   Ulices does not smoke or use smokeless tobacco.   Weight: 237 lbs    MEDICATIONS: Pulmicort Flexhaler inhalation, simvastatin oral, lisinopril oral, ALLERGIES: NKDA  Clinician Response:  Dear Ulices,   Based on your exposure to COVID-19 (coronavirus), we would like to test you for this virus.  1. Please call [phone] to schedule your visit. [facility]ain [facility]. Be ready " to share your [facility] visit ID number.  The following will serve as your written order for this COVID Test, ordered by me, for the indication of suspected COVID [Z20.828]: The test will be ordered in [employer], our electronic health record, after you are scheduled. It will show as ordered and authorized by Cecilio Moreno MD.  Order: COVID-19 (coronavirus) PCR for [facility]TOMATIC EXPOSURE [facility].  If you know you have had close contact with someone who tested positive, you should be quarantined for 14 days after this exposure. You should stay in quarantine for the14 days even if the covid test is negative, the optimal time to test after exposure is 5-7 days from the exposure  Quarantine means   What should I do?  For safety, it's very important to follow these rules. Do this for 14 days after the date you were last exposed to the virus..  Stay home and away from others. Don't go to school or anywhere else. Generally quarantine means staying home for work but there are some exceptions to this. Please contact your workplace.   No hugging, kissing or shaking hands.  Don't let anyone visit.  Cover your mouth and nose with a mask, tissue or washcloth to avoid spreading germs.  Wash your hands and face often. Use soap and water.  What are the symptoms of COVID-19?  The most common symptoms are cough, fever and trouble breathing. Less common symptoms include headache, body aches, fatigue (feeling very tired), chills, sore throat, stuffy or runny nose, diarrhea (loose poop), loss of taste or smell, belly pain, and nausea or vomiting (feeling sick to your stomach or throwing up).  After 14 days, if you have still don't have symptoms, you likely don't have this virus.  If you develop symptoms, follow these guidelines.  If you're normally healthy: Please start another [facility] visit to report your symptoms. Go to [email or web address].  If you have a serious health problem (like cancer, heart failure, an organ transplant or kidney  disease): Call your specialty clinic. Let them know that you might have COVID-19.  2. When it's time for your COVID test:  Stay at least 6 feet away from others. (If someone will drive you to your test, stay in the backseat, as far away from the  as you can.)  Cover your mouth and nose with a mask, tissue or washcloth.  Go straight to the testing site. Don't make any stops on the way there or back.  Please note  Caregivers in these groups are at risk for severe illness due to COVID-19:  o People 65 years and older  o People who live in a nursing home or long-term care facility  o People with chronic disease (lung, heart, cancer, diabetes, kidney, liver, immunologic)  o People who have a weakened immune system, including those who:  Are in cancer treatment  Take medicine that weakens the immune system, such as corticosteroids  Had a bone marrow or organ transplant  Have an immune deficiency  Have poorly controlled HIV or AIDS  Are obese (body mass index of 40 or higher)  Smoke regularly  Where can I get more information?  [facility] -- About COVID-19: [email or web address]  CDC -- What to Do If You're Sick: www.cdc.gov/coronavirus/2019-ncov/about/steps-when-sick.html  CDC -- Ending Home Isolation: www.cdc.gov/coronavirus/2019-ncov/hcp/disposition-in-home-patients.html  [facility] -- Caring for Someone: www.cdc.gov/coronavirus/2019-ncov/if-you-are-sick/care-for-someone.html  [facility] -- Interim Guidance for Hospital Discharge to Home: www.health.[facility].mn.us/diseases/coronavirus/hcp/hospdischarge.pdf  [facility] clinical trials (COVID-19 research studies): clinicalaffairs.[facility]-clinical-trials  Below are the COVID-19 hotlines at the [place] Department of Health ([facility]). Interpreters are available.  For health questions: Call 015-926-0897 or 1-513.391.2306 (7 a.m. to 7 p.m.)  For questions about schools and childcare: Call 341-849-6440 or 1-658.459.1147 (7 a.m. to 7 p.m.)    Diagnosis:  Cough  Diagnosis ICD: R05

## 2020-07-30 DIAGNOSIS — Z20.822 EXPOSURE TO COVID-19 VIRUS: Primary | ICD-10-CM

## 2020-07-30 PROCEDURE — U0003 INFECTIOUS AGENT DETECTION BY NUCLEIC ACID (DNA OR RNA); SEVERE ACUTE RESPIRATORY SYNDROME CORONAVIRUS 2 (SARS-COV-2) (CORONAVIRUS DISEASE [COVID-19]), AMPLIFIED PROBE TECHNIQUE, MAKING USE OF HIGH THROUGHPUT TECHNOLOGIES AS DESCRIBED BY CMS-2020-01-R: HCPCS | Performed by: FAMILY MEDICINE

## 2020-07-31 LAB
SARS-COV-2 RNA SPEC QL NAA+PROBE: NOT DETECTED
SPECIMEN SOURCE: NORMAL

## 2020-08-02 ENCOUNTER — NURSE TRIAGE (OUTPATIENT)
Dept: NURSING | Facility: CLINIC | Age: 55
End: 2020-08-02

## 2020-08-02 NOTE — TELEPHONE ENCOUNTER
Coronavirus (COVID-19) Notification    Lab Result   Lab test 2019-nCoV rRt-PCR OR SARS-COV-2 PCR    Nasopharyngeal AND/OR Oropharyngeal swab is NEGATIVE for 2019-nCoV RNA [OR] SARS-COV-2 RNA (COVID-19) RNA    Your result was negative. This means that we didn't find the virus that causes COVID-19 in your sample. A test may show negative when you do actually have the virus. This can happen when the virus is in the early stages of infection, before you feel illness symptoms.    If you have symptoms   Stay home and away from others (self-isolate) until you meet ALL of the guidelines below:    You've had no fever--and no medicine that reduces fever--for 3 full days (72 hours). And      Your other symptoms have gotten better. For example, your cough or breathing has improved. And     At least 10 days have passed since your symptoms started.    During this time:    Stay home. Don't go to work, school or anywhere else.     Stay in your own room, including for meals. Use your own bathroom if you can.    Stay away from others in your home. No hugging, kissing or shaking hands. No visitors.    Clean  high touch  surfaces often (doorknobs, counters, handles, etc.). Use a household cleaning spray or wipes. You can find a full list on the EPA website at www.epa.gov/pesticide-registration/list-n-disinfectants-use-against-sars-cov-2.    Cover your mouth and nose with a mask, tissue or washcloth to avoid spreading germs.    Wash your hands and face often with soap and water.    Going back to work  Check with your employer for any guidelines to follow for going back to work.  You are sent a letter for your Employer which will serve as formal document notice that you, the employee, tested negative for COVID-19, as of the testing date shown above.    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    Where can I get more information?    [facility]:  [email or web address]    Coronavirus Basics: www.[facility]./diseases/coronavirus/basics.html    [facility] Hotline (581-400-8098)    Elayne Wilson RN  [facility] Triage Nurse Advisor

## 2020-08-03 ENCOUNTER — VIRTUAL VISIT (OUTPATIENT)
Dept: FAMILY MEDICINE | Facility: OTHER | Age: 55
End: 2020-08-03
Payer: COMMERCIAL

## 2020-08-03 NOTE — PROGRESS NOTES
"Date: 2020 17:30:31  Clinician: Price Butt  Clinician NPI: [id number]  Patient: Ulices Gilbert  Patient : 1965  Patient Address: [address] [place] [address]  Patient Phone: [phone]  Visit Protocol: URI  Patient Summary:  Ulices is a 55 year old ( : 1965 ) male who initiated a Visit for cold, sinus infection, or influenza. When asked the question \"Please sign me up to receive news, health information and promotions from [email or web address].\", Ulices responded \"No\".    Ulices states his symptoms started suddenly 1 month or more ago. After his symptoms started, they improved and then got worse again.   His symptoms consist of a sore throat, malaise, and enlarged lymph nodes.   Symptom details   Sore throat: Ulices reports having mild throat pain (1-3 on a 10 point pain scale), does not have exudate on his tonsils, and can swallow liquids. The lymph nodes in his neck are enlarged. A rash has not appeared on the skin since the sore throat started.    Ulices denies having wheezing, nausea, teeth pain, ageusia, diarrhea, myalgias, anosmia, facial pain or pressure, fever, cough, nasal congestion, vomiting, rhinitis, ear pain, headache, and chills. He also denies having recent facial or sinus surgery in the past 60 days and taking antibiotic medication in the past month. He is not experiencing dyspnea.   Precipitating events  Ulices is not sure if he has been exposed to someone with strep throat.   Pertinent COVID-19 (Coronavirus) information  In the past 14 days, Ulices has not worked in a congregate living setting.   He does not work or volunteer as healthcare worker or a  and does not work or volunteer in a healthcare facility.   Ulices also has not lived in a congregate living setting in the past 14 days. He does not live with a healthcare worker.   Ulices has not had a close contact with a laboratory-confirmed COVID-19 patient within 14 days of symptom onset.   Pertinent medical history  Ulices " does not need a return to work/school note.   Weight: 235 lbs   Ulices does not smoke or use smokeless tobacco.   Additional information as reported by the patient (free text): when I got the sore throat I tested negative for Covid, and last week I was retested for covid due to exposure and again tested negative.   The sore throat that just wont go away makes me think I have strep and I would like to get tested for it. Thx   Weight: 235 lbs    MEDICATIONS: Pulmicort Flexhaler inhalation, simvastatin oral, lisinopril oral, ALLERGIES: NKDA  Clinician Response:  Dear Ulices,  I am sorry you are not feeling well. Your health is our priority. Based on the information provided, a throat swab is needed to determine whether or not you have strep throat. Please be seen in a clinic or urgent care in the next 1-2 days for a rapid strep test.  You will not be charged for this Visit. Thank you for trusting us with your care.  COVID-19 (Coronavirus) General Information  Because there is currently no vaccine to prevent infection, the best way to protect yourself is to avoid being exposed to this virus. Common symptoms of COVID-19 include but are not limited to fever, cough, and shortness of breath. These symptoms appear 2-14 days after you are exposed to the virus that causes COVID-19. Click here for more information from the CDC on how to protect yourself.  If you are sick with COVID-19 or suspect you are infected with the virus that causes COVID-19, follow the steps here from the CDC to help prevent the disease from spreading to people in your home and community.  Click here for general information from the CDC on testing.  If you develop any of these emergency warning signs for COVID-19, get medical attention immediately:     Trouble breathing    Persistent pain or pressure in the chest    New confusion or inability to arouse    Bluish lips or face      Call your doctor or clinic before going in. Call 911 if you have a medical  emergency and notify the  you have or think you may have COVID-19.  For more detailed and up to date information on COVID-19 (Coronavirus), please visit the CDC website.   Diagnosis: Refer for additional evaluation - strep pharyngitis  Diagnosis ICD: R69

## 2020-08-13 DIAGNOSIS — Z20.822 EXPOSURE TO COVID-19 VIRUS: ICD-10-CM

## 2020-08-13 PROCEDURE — 86769 SARS-COV-2 COVID-19 ANTIBODY: CPT | Performed by: EMERGENCY MEDICINE

## 2020-08-13 PROCEDURE — 36415 COLL VENOUS BLD VENIPUNCTURE: CPT | Performed by: EMERGENCY MEDICINE

## 2020-08-15 LAB
COVID-19 SPIKE RBD ABY TITER: NORMAL
COVID-19 SPIKE RBD ABY: NEGATIVE

## 2020-08-23 ENCOUNTER — COMMUNICATION - HEALTHEAST (OUTPATIENT)
Dept: FAMILY MEDICINE | Facility: CLINIC | Age: 55
End: 2020-08-23

## 2020-08-23 DIAGNOSIS — I10 ESSENTIAL HYPERTENSION: ICD-10-CM

## 2020-08-27 ENCOUNTER — NURSE TRIAGE (OUTPATIENT)
Dept: NURSING | Facility: CLINIC | Age: 55
End: 2020-08-27

## 2020-08-27 NOTE — TELEPHONE ENCOUNTER
Patient calls for COVID19 Serology results.   He has not received a letter yet regarding results.   Informed he should be getting a letter in the mail.   Patient is not signed up for [employer] and thought he was. System shows he declined.   Transferred to [employer] support to assist him with getting [employer] to have access to his results.     BUTCH Otto RN

## 2020-10-06 ENCOUNTER — COMMUNICATION - HEALTHEAST (OUTPATIENT)
Dept: FAMILY MEDICINE | Facility: CLINIC | Age: 55
End: 2020-10-06

## 2020-10-06 ENCOUNTER — AMBULATORY - HEALTHEAST (OUTPATIENT)
Dept: FAMILY MEDICINE | Facility: CLINIC | Age: 55
End: 2020-10-06

## 2020-10-06 DIAGNOSIS — J45.909 ASTHMA: ICD-10-CM

## 2020-11-02 ENCOUNTER — OFFICE VISIT - HEALTHEAST (OUTPATIENT)
Dept: FAMILY MEDICINE | Facility: CLINIC | Age: 55
End: 2020-11-02

## 2020-11-02 DIAGNOSIS — E78.5 HYPERLIPIDEMIA, UNSPECIFIED HYPERLIPIDEMIA TYPE: ICD-10-CM

## 2020-11-02 DIAGNOSIS — Z12.5 SCREENING PSA (PROSTATE SPECIFIC ANTIGEN): ICD-10-CM

## 2020-11-02 DIAGNOSIS — I10 ESSENTIAL HYPERTENSION: ICD-10-CM

## 2020-11-02 DIAGNOSIS — Z00.00 HEALTH CARE MAINTENANCE: ICD-10-CM

## 2020-11-02 DIAGNOSIS — E66.09 CLASS 1 OBESITY DUE TO EXCESS CALORIES WITHOUT SERIOUS COMORBIDITY WITH BODY MASS INDEX (BMI) OF 30.0 TO 30.9 IN ADULT: ICD-10-CM

## 2020-11-02 DIAGNOSIS — D12.6 COLON ADENOMAS: ICD-10-CM

## 2020-11-02 DIAGNOSIS — E55.9 VITAMIN D DEFICIENCY: ICD-10-CM

## 2020-11-02 DIAGNOSIS — E66.811 CLASS 1 OBESITY DUE TO EXCESS CALORIES WITHOUT SERIOUS COMORBIDITY WITH BODY MASS INDEX (BMI) OF 30.0 TO 30.9 IN ADULT: ICD-10-CM

## 2020-11-02 LAB
ALBUMIN SERPL-MCNC: 4.4 G/DL (ref 3.5–5)
ALP SERPL-CCNC: 56 U/L (ref 45–120)
ALT SERPL W P-5'-P-CCNC: 35 U/L (ref 0–45)
ANION GAP SERPL CALCULATED.3IONS-SCNC: 11 MMOL/L (ref 5–18)
AST SERPL W P-5'-P-CCNC: 26 U/L (ref 0–40)
BILIRUB SERPL-MCNC: 0.7 MG/DL (ref 0–1)
BUN SERPL-MCNC: 21 MG/DL (ref 8–22)
CALCIUM SERPL-MCNC: 9.5 MG/DL (ref 8.5–10.5)
CHLORIDE BLD-SCNC: 99 MMOL/L (ref 98–107)
CHOLEST SERPL-MCNC: 214 MG/DL
CO2 SERPL-SCNC: 27 MMOL/L (ref 22–31)
CREAT SERPL-MCNC: 0.83 MG/DL (ref 0.7–1.3)
FASTING STATUS PATIENT QL REPORTED: YES
GFR SERPL CREATININE-BSD FRML MDRD: >60 ML/MIN/1.73M2
GLUCOSE BLD-MCNC: 93 MG/DL (ref 70–125)
HBA1C MFR BLD: 5.1 %
HDLC SERPL-MCNC: 56 MG/DL
LDLC SERPL CALC-MCNC: 139 MG/DL
POTASSIUM BLD-SCNC: 4.7 MMOL/L (ref 3.5–5)
PROT SERPL-MCNC: 7.9 G/DL (ref 6–8)
PSA SERPL-MCNC: 0.9 NG/ML (ref 0–3.5)
SODIUM SERPL-SCNC: 137 MMOL/L (ref 136–145)
TRIGL SERPL-MCNC: 95 MG/DL

## 2020-11-02 ASSESSMENT — MIFFLIN-ST. JEOR: SCORE: 1958.11

## 2020-11-03 ENCOUNTER — COMMUNICATION - HEALTHEAST (OUTPATIENT)
Dept: FAMILY MEDICINE | Facility: CLINIC | Age: 55
End: 2020-11-03

## 2020-11-11 ENCOUNTER — COMMUNICATION - HEALTHEAST (OUTPATIENT)
Dept: FAMILY MEDICINE | Facility: CLINIC | Age: 55
End: 2020-11-11

## 2020-11-11 DIAGNOSIS — E78.5 HYPERLIPIDEMIA: ICD-10-CM

## 2020-11-14 ENCOUNTER — HEALTH MAINTENANCE LETTER (OUTPATIENT)
Age: 55
End: 2020-11-14

## 2020-11-15 RX ORDER — SIMVASTATIN 20 MG
TABLET ORAL
Qty: 135 TABLET | Refills: 3 | Status: SHIPPED | OUTPATIENT
Start: 2020-11-15 | End: 2021-11-17

## 2021-01-07 ENCOUNTER — COMMUNICATION - HEALTHEAST (OUTPATIENT)
Dept: FAMILY MEDICINE | Facility: CLINIC | Age: 56
End: 2021-01-07

## 2021-01-07 DIAGNOSIS — J45.909 ASTHMA: ICD-10-CM

## 2021-01-07 RX ORDER — BUDESONIDE 180 UG/1
AEROSOL, POWDER RESPIRATORY (INHALATION)
Qty: 1 INHALER | Refills: 6 | Status: SHIPPED | OUTPATIENT
Start: 2021-01-07 | End: 2022-01-03 | Stop reason: ALTCHOICE

## 2021-02-13 ENCOUNTER — COMMUNICATION - HEALTHEAST (OUTPATIENT)
Dept: FAMILY MEDICINE | Facility: CLINIC | Age: 56
End: 2021-02-13

## 2021-02-13 DIAGNOSIS — I10 ESSENTIAL HYPERTENSION: ICD-10-CM

## 2021-02-14 RX ORDER — LISINOPRIL 5 MG/1
TABLET ORAL
Qty: 90 TABLET | Refills: 2 | Status: SHIPPED | OUTPATIENT
Start: 2021-02-14 | End: 2021-11-17

## 2021-05-25 ENCOUNTER — RECORDS - HEALTHEAST (OUTPATIENT)
Dept: ADMINISTRATIVE | Facility: CLINIC | Age: 56
End: 2021-05-25

## 2021-05-26 VITALS — HEART RATE: 70 BPM | SYSTOLIC BLOOD PRESSURE: 125 MMHG | DIASTOLIC BLOOD PRESSURE: 77 MMHG

## 2021-05-26 NOTE — PROGRESS NOTES
I met with Ulices Gilbert at the request of Dr. Wolfe to recheck his blood pressure.  Blood pressure medications on the MAR were reviewed with patient.    Patient has taken all medications as per usual regimen: Yes  Patient reports tolerating them without any issues or concerns: Yes, but he does get some dizziness or lightheaded occasionally.    Vitals:    03/22/19 1351   BP: 116/63   Pulse: 73       Blood pressure was taken, previous encounter was reviewed, recorded blood pressure below 140/90.  Patient was discharged and the note will be sent to the provider for final review.     He does get some dizziness or lightheadedness occasionally, but does not bother him.  I advised to stay well hydrated and if it continues or becomes bothersome, just let us know.

## 2021-05-27 ENCOUNTER — RECORDS - HEALTHEAST (OUTPATIENT)
Dept: ADMINISTRATIVE | Facility: CLINIC | Age: 56
End: 2021-05-27

## 2021-05-27 VITALS — HEART RATE: 64 BPM | SYSTOLIC BLOOD PRESSURE: 126 MMHG | DIASTOLIC BLOOD PRESSURE: 73 MMHG

## 2021-05-27 NOTE — PROGRESS NOTES
DIAGNOSIS:  1. Essential hypertension  Basic Metabolic Panel    aspirin 81 MG EC tablet   2. Bee sting reaction, accidental or unintentional, subsequent encounter  EPINEPHrine (EPIPEN/ADRENACLICK/AUVI-Q) 0.3 mg/0.3 mL injection   3. Vitamin deficiency  Vitamin D, Total (25-Hydroxy)       PLAN:  Patient Instructions   Check POTASSIUM and KIDNEY FUNCTION    Check Vitamin D level    START VITAMIN D 2000 UNITS DAILY    Refill epi pen    Schedule an eye exam        HPI:  Colors on side of vision mainly in the left eye 3 days ago and lasted 15 min.  Hasn't happened before or since.  Did get a mild headache shortly after that happened.  Hasn't seen eye doc in 2 years.         Current Outpatient Medications on File Prior to Visit   Medication Sig Dispense Refill     budesonide (PULMICORT FLEXHALER) 180 mcg/actuation inhaler Take one puff twice daily. 1 Inhaler 3     hydroCHLOROthiazide (HYDRODIURIL) 25 MG tablet Take 1 tablet (25 mg total) by mouth daily. 90 tablet 2     lisinopril (PRINIVIL,ZESTRIL) 5 MG tablet Take 1 tablet (5 mg total) by mouth daily. 30 tablet 2     simvastatin (ZOCOR) 20 MG tablet TAKE 1 AND 1/2 TABLETS (30 MG) BY MOUTH ONCE DAILY. 135 tablet 3     [DISCONTINUED] EPINEPHrine (EPIPEN/ADRENACLICK/AUVI-Q) 0.3 mg/0.3 mL injection Inject 0.3 mg into the shoulder, thigh, or buttocks.       No current facility-administered medications on file prior to visit.        Pmh: reviewed  Psh: reviewed  Allergy:  reviewed      EXAM:    /68   Pulse 61   Temp 96.9  F (36.1  C) (Oral)   Resp 16   Wt (!) 267 lb 6.4 oz (121.3 kg)   BMI 36.27 kg/m     Wt Readings from Last 3 Encounters:   04/01/19 (!) 267 lb 6.4 oz (121.3 kg)   02/27/19 (!) 269 lb 6.4 oz (122.2 kg)   02/06/19 (!) 268 lb (121.6 kg)      BP Readings from Last 3 Encounters:   04/01/19 111/68   03/22/19 116/63   02/27/19 (!) 141/93      GEN:   ALERT, NAD, ORIENTED TIMES THREE  NECK: SUPPLE WITHOUT ADENOPATHY OR THYROMEGALY  LUNGS: CTA  COR: RRR  WITHOUT MURMUR  SKIN:  NO RASH  EXT: WITHOUT EDEMA/SWELLING    No results found for this or any previous visit (from the past 168 hour(s)).    Lab Results   Component Value Date    CHOL 209 (H) 12/07/2018    CHOL 164 11/13/2017    CHOL 159 11/22/2016     Lab Results   Component Value Date    HDL 49 12/07/2018    HDL 47 11/13/2017    HDL 38 (L) 11/22/2016     Lab Results   Component Value Date    LDLCALC 140 (H) 12/07/2018    LDLCALC 102 11/13/2017    LDLCALC 102 11/22/2016     Lab Results   Component Value Date    TRIG 99 12/07/2018    TRIG 73 11/13/2017    TRIG 96 11/22/2016     Results for orders placed or performed in visit on 12/07/18   Comprehensive Metabolic Panel   Result Value Ref Range    Sodium 141 136 - 145 mmol/L    Potassium 4.3 3.5 - 5.0 mmol/L    Chloride 103 98 - 107 mmol/L    CO2 23 22 - 31 mmol/L    Anion Gap, Calculation 15 5 - 18 mmol/L    Glucose 74 70 - 125 mg/dL    BUN 16 8 - 22 mg/dL    Creatinine 0.80 0.70 - 1.30 mg/dL    GFR MDRD Af Amer >60 >60 mL/min/1.73m2    GFR MDRD Non Af Amer >60 >60 mL/min/1.73m2    Bilirubin, Total 0.7 0.0 - 1.0 mg/dL    Calcium 9.6 8.5 - 10.5 mg/dL    Protein, Total 7.8 6.0 - 8.0 g/dL    Albumin 4.3 3.5 - 5.0 g/dL    Alkaline Phosphatase 79 45 - 120 U/L    AST 29 0 - 40 U/L    ALT 44 0 - 45 U/L     Lab Results   Component Value Date    PSA 0.7 12/21/2018    PSA 0.6 11/13/2017    PSA 1.0 11/22/2016     Vitamin D, Total (25-Hydroxy)   Date Value Ref Range Status   11/22/2016 10.4 (L) 30.0 - 80.0 ng/mL Final        No components found for: CHOLHDL

## 2021-05-27 NOTE — PATIENT INSTRUCTIONS - HE
Check POTASSIUM and KIDNEY FUNCTION    Check Vitamin D level    START VITAMIN D 2000 UNITS DAILY    Refill epi pen    Schedule an eye exam

## 2021-05-28 ASSESSMENT — ASTHMA QUESTIONNAIRES
ACT_TOTALSCORE: 20
ACT_TOTALSCORE: 24

## 2021-05-29 ENCOUNTER — RECORDS - HEALTHEAST (OUTPATIENT)
Dept: ADMINISTRATIVE | Facility: CLINIC | Age: 56
End: 2021-05-29

## 2021-05-29 NOTE — TELEPHONE ENCOUNTER
Refill Approved    Rx renewed per Medication Renewal Policy. Medication was last renewed on 2/27/19.    Cait Chong, [facility] Triage/Med Refill 5/21/2019     Requested Prescriptions   Pending Prescriptions Disp Refills     lisinopril (PRINIVIL,ZESTRIL) 5 MG tablet [Pharmacy Med Name: Lisinopril Oral Tablet 5 MG] 30 tablet 1     Sig: TAKE 1 TABLET (5 MG) BY MOUTH ONCE DAILY       Ace Inhibitors Refill Protocol Passed - 5/20/2019  3:27 PM        Passed - PCP or prescribing provider visit in past 12 months       Last office visit with prescriber/PCP: 4/1/2019 Price Wolfe MD OR same dept: 4/1/2019 Price Wolfe MD OR same specialty: 4/1/2019 Price Wolfe MD  Last physical: Visit date not found Last MTM visit: Visit date not found   Next visit within 3 mo: Visit date not found  Next physical within 3 mo: Visit date not found  Prescriber OR PCP: Price Wolfe MD  Last diagnosis associated with med order: 1. Essential hypertension  - lisinopril (PRINIVIL,ZESTRIL) 5 MG tablet [Pharmacy Med Name: Lisinopril Oral Tablet 5 MG]; TAKE 1 TABLET (5 MG) BY MOUTH ONCE DAILY  Dispense: 30 tablet; Refill: 1    If protocol passes may refill for 12 months if within 3 months of last provider visit (or a total of 15 months).             Passed - Serum Potassium in past 12 months     Lab Results   Component Value Date    Potassium 4.0 04/01/2019             Passed - Blood pressure filed in past 12 months     BP Readings from Last 1 Encounters:   04/01/19 111/68             Passed - Serum Creatinine in past 12 months     Creatinine   Date Value Ref Range Status   04/01/2019 0.86 0.70 - 1.30 mg/dL Final

## 2021-05-29 NOTE — TELEPHONE ENCOUNTER
Attempt made to contact patient to relay message from Dr. Wolfe.  Left message to call back.    Tanika Mendoza, RN  Triage Nurse Advisor

## 2021-05-29 NOTE — TELEPHONE ENCOUNTER
Please give pt a follow up call this evening to see how he is doing.  If he still has marked hives despite use of benadryl then he should go to the ER

## 2021-05-29 NOTE — TELEPHONE ENCOUNTER
RN Triage:   Left voice message to have patient call and update on how he is feeling.     Shari Bravo RN, BSN [facility] Triage Nurse

## 2021-05-31 VITALS — BODY MASS INDEX: 34.83 KG/M2 | WEIGHT: 258.6 LBS

## 2021-06-01 NOTE — TELEPHONE ENCOUNTER
RN cannot approve Refill Request    RN can NOT refill this medication med is not covered by policy/route to provider. Last office visit: 4/1/2019 Price Wolfe MD Last Physical: Visit date not found Last MTM visit: Visit date not found Last visit same specialty: 4/1/2019 Price Wolfe MD.  Next visit within 3 mo: Visit date not found  Next physical within 3 mo: Visit date not found      Candice Segal, [facility] Triage/Med Refill 9/12/2019    Requested Prescriptions   Pending Prescriptions Disp Refills     budesonide (PULMICORT FLEXHALER) 180 mcg/actuation inhaler [Pharmacy Med Name: Pulmicort Flexhaler Inhalation Aerosol Powder Breath Activated 180 MCG/ACT]  2     Sig: inhale 1 puff by mouth twice daily.       There is no refill protocol information for this order

## 2021-06-02 VITALS — BODY MASS INDEX: 36.27 KG/M2 | WEIGHT: 267.4 LBS

## 2021-06-02 VITALS — WEIGHT: 268 LBS | HEIGHT: 72 IN | BODY MASS INDEX: 36.3 KG/M2

## 2021-06-02 VITALS — WEIGHT: 272.2 LBS | BODY MASS INDEX: 36.66 KG/M2

## 2021-06-02 VITALS — WEIGHT: 268 LBS | BODY MASS INDEX: 36.1 KG/M2

## 2021-06-02 VITALS — BODY MASS INDEX: 36.54 KG/M2 | WEIGHT: 269.4 LBS

## 2021-06-02 NOTE — TELEPHONE ENCOUNTER
Refill Approved    Rx renewed per Medication Renewal Policy. Medication was last renewed on [id number]    Shweta Peralta, [facility] Triage/Med Refill 10/30/2019     Requested Prescriptions   Pending Prescriptions Disp Refills     hydroCHLOROthiazide (HYDRODIURIL) 25 MG tablet [Pharmacy Med Name: hydroCHLOROthiazide Oral Tablet 25 MG] 90 tablet 1     Sig: Take 1 tablet (25 mg) by mouth once daily       Diuretics/Combination Diuretics Refill Protocol  Passed - 10/30/2019  9:07 AM        Passed - Visit with PCP or prescribing provider visit in past 12 months     Last office visit with prescriber/PCP: 4/1/2019 Price Wolfe MD OR same dept: 4/1/2019 Price Wolfe MD OR same specialty: 4/1/2019 Price Wolfe MD  Last physical: Visit date not found Last MTM visit: Visit date not found   Next visit within 3 mo: Visit date not found  Next physical within 3 mo: Visit date not found  Prescriber OR PCP: Price Wolfe MD  Last diagnosis associated with med order: 1. Essential hypertension  - hydroCHLOROthiazide (HYDRODIURIL) 25 MG tablet [Pharmacy Med Name: hydroCHLOROthiazide Oral Tablet 25 MG]; Take 1 tablet (25 mg) by mouth once daily  Dispense: 90 tablet; Refill: 1    If protocol passes may refill for 12 months if within 3 months of last provider visit (or a total of 15 months).             Passed - Serum Potassium in past 12 months      Lab Results   Component Value Date    Potassium 4.0 04/01/2019             Passed - Serum Sodium in past 12 months      Lab Results   Component Value Date    Sodium 138 04/01/2019             Passed - Blood pressure on file in past 12 months     BP Readings from Last 1 Encounters:   04/01/19 111/68             Passed - Serum Creatinine in past 12 months      Creatinine   Date Value Ref Range Status   04/01/2019 0.86 0.70 - 1.30 mg/dL Final

## 2021-06-02 NOTE — TELEPHONE ENCOUNTER
Second Attempt[facility] 2019.

## 2021-06-03 NOTE — PATIENT INSTRUCTIONS - HE
Shingrix #1 today and then repeat in 2 months.    decrease HYDROCHLOROTHIAZIDE to 12.5 mg daily  Nurse BP in 1-2 weeks.  Follow up in one month.    Fasting labs

## 2021-06-03 NOTE — PROGRESS NOTES
Assessment:      Healthy male exam.    Encounter Diagnoses   Name Primary?     Essential hypertension                         The diagnosis was confirmed.  The condition is stable/controlled on LISINOPRIL, HCTZ.  BP appears slightly on low side so will reduce HCTZ TO 12.5 MG DAILY.  The appropriate follow up labs  ( CMP )have been ordered  (OR ARE UP TO DATE) and medication refills ordered if requested.        Hyperlipidemia                         The diagnosis was confirmed.  The condition is stable/controlled on SIMVASTATIN and no side effects  have been noted.  The appropriate follow up labs  ( FLP/CMP )have been ordered  (OR ARE UP TO DATE) and medication refills ordered if requested.        Screening PSA (prostate specific antigen) Yes     Vitamin D deficiency, ON REPLACEMENT THERAPY      Need for shingles vaccine      Elevated blood sugar, MILD ELEVATION ON ? 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  The condition is stable/controlled on PULMICORT and no side effects  have been noted.  The appropriate follow up labs  ( ACT:  20 )have been ordered  (OR ARE UP TO DATE) and medication refills ordered if requested.       Plan:        Shingrix #1 today and then repeat in 2 months.    decrease HYDROCHLOROTHIAZIDE to 12.5 mg daily  Nurse BP in 1-2 weeks.  Follow up in one month.    Fasting labs    The following high BMI interventions were performed this visit: encouragement to exercise and dietary management education, guidance, and counseling           Subjective:      Ulices Gilbert is a 54 y.o. male who presents for an annual exam. The patient reports that there is not domestic violence in his life.     Healthy Habits:   Regular Exercise: No  Sunscreen Use: Yes  Healthy Diet: Yes  Dental Visits Regularly: Yes  Seat Belt: Yes  Sexually  active: Yes  Monthly Self Testicular Exams:  No  Hemoccults: N/A  Flex Sig: N/A  Colonoscopy: Yes and 8-14-17 and due Aug 2022  Lipid Profile: Yes  Glucose Screen: Yes  Prevention of Osteoporosis: Yes  Last Dexa: N/A  Guns at Home:  Yes  Gun safe/class:  Yes      Immunization History   Administered Date(s) Administered     Hep A, historic 12/18/2007, 06/19/2008     INFLUENZA,RECOMBINANT,INJ,PF QUADRIVALENT 18+YRS 12/07/2018     Influenza, Seasonal, Inj PF IIV3 09/29/2009     Influenza, inj, historic,unspecified 10/31/2010, 10/23/2017, 10/15/2019     Influenza, seasonal,quad inj 6-35 mos 09/28/2011, 09/14/2012, 01/03/2014     Influenza,seasonal quad, PF, =/> 6months 01/08/2016     Influenza,seasonal,quad inj =/> 6months 11/22/2016     Mumps 06/29/1979     Pneumo Conj 13-V (2010&after) 11/13/2017     Pneumo Polysac 23-V 11/22/2016     Td,adult,historic,unspecified 10/23/1998     Tdap 12/18/2007, 11/13/2017     Immunization status: up to date and documented.    No exam data present    Current Outpatient Medications   Medication Sig Dispense Refill     aspirin 81 MG EC tablet Take 1 tablet (81 mg total) by mouth daily.  0     budesonide (PULMICORT FLEXHALER) 180 mcg/actuation inhaler inhale 1 puff by mouth twice daily. 1 Inhaler 2     cholecalciferol, vitamin D3, (VITAMIN D3) 2,000 unit Tab Take 2 tablets (4,000 Units total) by mouth daily. AFTER 3 MONTHS RECHECK VITAMIN D LEVEL  0     EPINEPHrine (EPIPEN/ADRENACLICK/AUVI-Q) 0.3 mg/0.3 mL injection Inject 0.3 mL (0.3 mg total) into the shoulder, thigh, or buttocks as needed for anaphylaxis. 2 Pre-filled Pen Syringe 0     hydroCHLOROthiazide (HYDRODIURIL) 12.5 MG tablet Take 1 tablet (12.5 mg total) by mouth daily. 90 tablet 3     lisinopril (PRINIVIL,ZESTRIL) 5 MG tablet TAKE 1 TABLET (5 MG) BY MOUTH ONCE DAILY 90 tablet 2     simvastatin (ZOCOR) 20 MG tablet TAKE 1 AND 1/2 TABLETS (30 MG) BY MOUTH ONCE DAILY 135 tablet 3     No current facility-administered medications  for this visit.      No past medical history on file.  Past Surgical History:   Procedure Laterality Date     ARTHROSCOPIC REPAIR ACL Left      Venom-honey bee  No family history on file.  Social History     Socioeconomic History     Marital status:      Spouse name: Not on file     Number of children: Not on file     Years of education: Not on file     Highest education level: Not on file   Occupational History     Not on file   Social Needs     Financial resource strain: Not on file     Food insecurity:     Worry: Not on file     Inability: Not on file     Transportation needs:     Medical: Not on file     Non-medical: Not on file   Tobacco Use     Smoking status: Never Smoker     Smokeless tobacco: Never Used   Substance and Sexual Activity     Alcohol use: Not on file     Drug use: Not on file     Sexual activity: Not on file   Lifestyle     Physical activity:     Days per week: Not on file     Minutes per session: Not on file     Stress: Not on file   Relationships     Social connections:     Talks on phone: Not on file     Gets together: Not on file     Attends [employer] service: Not on file     Active member of club or organization: Not on file     Attends meetings of clubs or organizations: Not on file     Relationship status: Not on file     Intimate partner violence:     Fear of current or ex partner: Not on file     Emotionally abused: Not on file     Physically abused: Not on file     Forced sexual activity: Not on file   Other Topics Concern     Not on file   Social History Narrative     Not on file       Review of Systems  General:  Denies problem  Eyes: Denies problem  Ears/Nose/Throat: Denies problem  Cardiovascular: Denies problem  Respiratory:  lots of sputum first thing in the morning.  Gastrointestinal:  Denies problem  Genitourinary: Denies problem  Musculoskeletal:  Denies problem  Skin: Denies problem  Neurologic: Denies problem  Psychiatric: Denies problem  Endocrine: Denies  "problem  Heme/Lymphatic: Denies problem   Allergic/Immunologic: Denies problem        Objective:     Vitals:    11/22/19 1142   BP: 108/75   Pulse: 61   Resp: 20   Temp: 96.6  F (35.9  C)   TempSrc: Oral   Weight: (!) 267 lb 9.6 oz (121.4 kg)   Height: 5' 11.75\" (1.822 m)     Body mass index is 36.55 kg/m .  Wt Readings from Last 3 Encounters:   11/22/19 (!) 267 lb 9.6 oz (121.4 kg)   04/01/19 (!) 267 lb 6.4 oz (121.3 kg)   02/27/19 (!) 269 lb 6.4 oz (122.2 kg)      Physical  General Appearance: Alert, cooperative, no distress, appears stated age  Head: Normocephalic, without obvious abnormality, atraumatic  Eyes: PERRL, conjunctiva/corneas clear, EOM's intact  Ears: Normal TM's and external ear canals, both ears  Nose: Nares normal, septum midline,mucosa normal, no drainage  Throat: Lips, mucosa, and tongue normal; teeth and gums normal  Neck: Supple, symmetrical, trachea midline, no adenopathy;  thyroid: not enlarged, symmetric, no tenderness/mass/nodules; no carotid bruit or JVD  Back: Symmetric, no curvature, ROM normal, no CVA tenderness  Lungs: Clear to auscultation bilaterally, respirations unlabored  Heart: Regular rate and rhythm, S1 and S2 normal, no murmur, rub, or gallop,  Abdomen: Soft, non-tender, bowel sounds active all four quadrants,  no masses, no organomegaly  Genitourinary: Penis normal. Right testis is descended. Left testis is descended.   PROSTATE SMOOTH SYMMETRIC WITHOUT NODULARITY, TX, OR FIRMNESS  Musculoskeletal: Normal range of motion. No joint swelling or deformity.   Extremities: Extremities normal, atraumatic, no cyanosis or edema  Skin: Skin color, texture, turgor normal, no rashes or lesions  Lymph nodes: Cervical, supraclavicular, and axillary nodes normal  Neurologic: He is alert. He has normal reflexes.   Psychiatric: He has a normal mood and affect.      Vitamin D, Total (25-Hydroxy)   Date Value Ref Range Status   07/24/2019 39.9 30.0 - 80.0 ng/mL Final     Lab Results "   Component Value Date    PSA 0.7 12/21/2018    PSA 0.6 11/13/2017    PSA 1.0 11/22/2016     Results for orders placed or performed in visit on 12/07/18   Comprehensive Metabolic Panel   Result Value Ref Range    Sodium 141 136 - 145 mmol/L    Potassium 4.3 3.5 - 5.0 mmol/L    Chloride 103 98 - 107 mmol/L    CO2 23 22 - 31 mmol/L    Anion Gap, Calculation 15 5 - 18 mmol/L    Glucose 74 70 - 125 mg/dL    BUN 16 8 - 22 mg/dL    Creatinine 0.80 0.70 - 1.30 mg/dL    GFR MDRD Af Amer >60 >60 mL/min/1.73m2    GFR MDRD Non Af Amer >60 >60 mL/min/1.73m2    Bilirubin, Total 0.7 0.0 - 1.0 mg/dL    Calcium 9.6 8.5 - 10.5 mg/dL    Protein, Total 7.8 6.0 - 8.0 g/dL    Albumin 4.3 3.5 - 5.0 g/dL    Alkaline Phosphatase 79 45 - 120 U/L    AST 29 0 - 40 U/L    ALT 44 0 - 45 U/L     Lab Results   Component Value Date    CHOL 209 (H) 12/07/2018    CHOL 164 11/13/2017    CHOL 159 11/22/2016     Lab Results   Component Value Date    HDL 49 12/07/2018    HDL 47 11/13/2017    HDL 38 (L) 11/22/2016     Lab Results   Component Value Date    LDLCALC 140 (H) 12/07/2018    LDLCALC 102 11/13/2017    LDLCALC 102 11/22/2016     Lab Results   Component Value Date    TRIG 99 12/07/2018    TRIG 73 11/13/2017    TRIG 96 11/22/2016     No components found for: CHOLHDL

## 2021-06-04 VITALS
DIASTOLIC BLOOD PRESSURE: 71 MMHG | WEIGHT: 269 LBS | HEART RATE: 60 BPM | SYSTOLIC BLOOD PRESSURE: 114 MMHG | RESPIRATION RATE: 20 BRPM | BODY MASS INDEX: 36.74 KG/M2

## 2021-06-04 VITALS
RESPIRATION RATE: 20 BRPM | WEIGHT: 267.6 LBS | DIASTOLIC BLOOD PRESSURE: 75 MMHG | HEIGHT: 72 IN | TEMPERATURE: 96.6 F | BODY MASS INDEX: 36.24 KG/M2 | SYSTOLIC BLOOD PRESSURE: 108 MMHG | HEART RATE: 61 BPM

## 2021-06-04 VITALS
HEART RATE: 72 BPM | OXYGEN SATURATION: 97 % | WEIGHT: 257.8 LBS | TEMPERATURE: 98.5 F | BODY MASS INDEX: 35.21 KG/M2 | DIASTOLIC BLOOD PRESSURE: 72 MMHG | SYSTOLIC BLOOD PRESSURE: 117 MMHG | RESPIRATION RATE: 20 BRPM

## 2021-06-04 NOTE — TELEPHONE ENCOUNTER
Patient is coming in for a nurse only appt on 01/24/20 at 3:30PM for his 2nd Shingrix Vaccine. Please place order if appropriate.    Thank you!

## 2021-06-04 NOTE — PROGRESS NOTES
Follow Up Blood Pressure Check    Ulices Gilbert is a 54 y.o. male recommended to follow up for blood pressure check by Price Wolfe MD. Anihypertensive medications and adherence were verified: Yes.     Reason for visit: Hypotension at last OV on 11/22/19 (108/75)    Medication change at last visit: HCTZ decreased to 12.5 mg daily    Today's Vitals:   Vitals:    12/10/19 1428   BP: 125/77   Patient Site: Left Arm   Patient Position: Sitting   Cuff Size: Adult Large   Pulse: 70       Lowest blood pressure today is less than 140/90 and they deny signs or symptoms of new onset: severe headache, fatigue, confusion, vision changes, chest pain, pounding in the chest, neck, ears, irregular heartbeat, difficulty breathing and blood in the urine.  Please inform patient of his/her blood pressure today.  If they are asymptomatic, the patient is to continue current medications.  This message will be routed to their provider, and they will be notified if a change in medication is recommended.    Current Outpatient Medications   Medication Sig Dispense Refill     aspirin 81 MG EC tablet Take 1 tablet (81 mg total) by mouth daily.  0     budesonide (PULMICORT FLEXHALER) 180 mcg/actuation inhaler inhale 1 puff by mouth twice daily. 1 Inhaler 2     cholecalciferol, vitamin D3, (VITAMIN D3) 2,000 unit Tab Take 2 tablets (4,000 Units total) by mouth daily. AFTER 3 MONTHS RECHECK VITAMIN D LEVEL  0     EPINEPHrine (EPIPEN/ADRENACLICK/AUVI-Q) 0.3 mg/0.3 mL injection Inject 0.3 mL (0.3 mg total) into the shoulder, thigh, or buttocks as needed for anaphylaxis. 2 Pre-filled Pen Syringe 0     hydroCHLOROthiazide (HYDRODIURIL) 12.5 MG tablet Take 1 tablet (12.5 mg total) by mouth daily. 90 tablet 3     lisinopril (PRINIVIL,ZESTRIL) 5 MG tablet TAKE 1 TABLET (5 MG) BY MOUTH ONCE DAILY 90 tablet 2     simvastatin (ZOCOR) 20 MG tablet TAKE 1 AND 1/2 TABLETS (30 MG) BY MOUTH ONCE DAILY 135 tablet 3     No current  facility-administered medications for this visit.

## 2021-06-04 NOTE — PROGRESS NOTES
DIAGNOSIS:  1. Essential hypertension                          The diagnosis was confirmed.  The condition is stable/controlled on HCTZ, LISINOPRIL and no side effects  have been noted.  The appropriate follow up labs  ( BMP )have been ordered  (OR ARE UP TO DATE) and medication refills ordered if requested.   Basic Metabolic Panel   2. Hyperlipidemia, unspecified hyperlipidemia type                          The diagnosis was confirmed.  The condition is stable/controlled on SIMVASTATIN and no side effects  have been noted.  The appropriate follow up labs  ( CMP, FLP)have been ordered  (OR ARE UP TO DATE) and medication refills ordered if requested.      3. Other type of myocardial infarction (H) , NO CAD identified on cath by patient report.        PLAN:     Check potassium    stay on current meds    No need to increase the statin as there never was any documented CAD            HPI:  H/o MI at least 20 years ago.  It was not rlalted to CAD but rather to a strep infection at the time.   He was still started on a statin.  No lightheadedness.   No chest pain or shortness of breath.           Current Outpatient Medications on File Prior to Visit   Medication Sig Dispense Refill     aspirin 81 MG EC tablet Take 1 tablet (81 mg total) by mouth daily.  0     budesonide (PULMICORT FLEXHALER) 180 mcg/actuation inhaler inhale 1 puff by mouth twice daily. 1 Inhaler 2     cholecalciferol, vitamin D3, (VITAMIN D3) 2,000 unit Tab Take 2 tablets (4,000 Units total) by mouth daily. AFTER 3 MONTHS RECHECK VITAMIN D LEVEL  0     EPINEPHrine (EPIPEN/ADRENACLICK/AUVI-Q) 0.3 mg/0.3 mL injection Inject 0.3 mL (0.3 mg total) into the shoulder, thigh, or buttocks as needed for anaphylaxis. 2 Pre-filled Pen Syringe 0     hydroCHLOROthiazide (HYDRODIURIL) 12.5 MG tablet Take 1 tablet (12.5 mg total) by mouth daily. 90 tablet 3     lisinopril (PRINIVIL,ZESTRIL) 5 MG tablet TAKE 1 TABLET (5 MG) BY MOUTH ONCE DAILY 90 tablet 2     simvastatin  (ZOCOR) 20 MG tablet TAKE 1 AND 1/2 TABLETS (30 MG) BY MOUTH ONCE DAILY 135 tablet 3     No current facility-administered medications on file prior to visit.        Pmh: reviewed  Psh: reviewed  Allergy:  reviewed      EXAM:    /71 (Patient Site: Left Arm, Patient Position: Sitting, Cuff Size: Adult Large)   Pulse 60   Resp 20   Wt (!) 269 lb (122 kg)   BMI 36.74 kg/m    GEN:   ALERT, NAD, ORIENTED TIMES THREE  HEENT: TMS NL, PERRL, THR CLEAR  NECK: SUPPLE WITHOUT ADENOPATHY OR THYROMEGALY  LUNGS: CTA  COR: RRR WITHOUT MURMUR  ABD: SOFT, +BS, NONTX WITHOUT GUARDING OR PERITONEAL SIGNS  SKIN: NO RASH , ULCERS OR LESIONS NOTED  MS:  NEURO:  EXT: WITHOUT EDEMA/SWELLING    No results found for this or any previous visit (from the past 168 hour(s)).  Results for orders placed or performed in visit on 04/01/19   Basic Metabolic Panel   Result Value Ref Range    Sodium 138 136 - 145 mmol/L    Potassium 4.0 3.5 - 5.0 mmol/L    Chloride 102 98 - 107 mmol/L    CO2 22 22 - 31 mmol/L    Anion Gap, Calculation 14 5 - 18 mmol/L    Glucose 146 (H) 70 - 125 mg/dL    Calcium 9.2 8.5 - 10.5 mg/dL    BUN 24 (H) 8 - 22 mg/dL    Creatinine 0.86 0.70 - 1.30 mg/dL    GFR MDRD Af Amer >60 >60 mL/min/1.73m2    GFR MDRD Non Af Amer >60 >60 mL/min/1.73m2       Results for orders placed or performed in visit on 11/22/19   Comprehensive Metabolic Panel   Result Value Ref Range    Sodium 138 136 - 145 mmol/L    Potassium 4.1 3.5 - 5.0 mmol/L    Chloride 99 98 - 107 mmol/L    CO2 25 22 - 31 mmol/L    Anion Gap, Calculation 14 5 - 18 mmol/L    Glucose 85 70 - 125 mg/dL    BUN 18 8 - 22 mg/dL    Creatinine 0.86 0.70 - 1.30 mg/dL    GFR MDRD Af Amer >60 >60 mL/min/1.73m2    GFR MDRD Non Af Amer >60 >60 mL/min/1.73m2    Bilirubin, Total 0.7 0.0 - 1.0 mg/dL    Calcium 9.7 8.5 - 10.5 mg/dL    Protein, Total 8.1 (H) 6.0 - 8.0 g/dL    Albumin 4.4 3.5 - 5.0 g/dL    Alkaline Phosphatase 68 45 - 120 U/L    AST 30 0 - 40 U/L    ALT 56 (H) 0 - 45  U/L

## 2021-06-04 NOTE — PATIENT INSTRUCTIONS - HE
Check potassium    stay on current meds    No need to increase the statin as there never was any documented CAD

## 2021-06-05 VITALS
DIASTOLIC BLOOD PRESSURE: 83 MMHG | TEMPERATURE: 97.9 F | WEIGHT: 241.2 LBS | HEIGHT: 72 IN | RESPIRATION RATE: 20 BRPM | BODY MASS INDEX: 32.67 KG/M2 | HEART RATE: 67 BPM | SYSTOLIC BLOOD PRESSURE: 121 MMHG

## 2021-06-06 NOTE — TELEPHONE ENCOUNTER
Prior Authorization Request  Who s requesting:  Pharmacy  Pharmacy Name and Location: [facility] #[id number]  Medication Name: simvastatin (ZOCOR) 20 MG tablet  Insurance Plan: [employer]   Insurance Member ID Number:  [id number]  CoverMyMeds Key: [id number]  Informed patient that prior authorizations can take up to 10 business days for response:   NA  Okay to leave a detailed message: No

## 2021-06-06 NOTE — PROGRESS NOTES
DIAGNOSIS:  1. Acute gout involving toe of right foot, unspecified cause, SXS NEARLY RESOLVED Uric Acid   2. Viral upper respiratory tract infection     3.      HTN, controlled,  Will need to watch for any rebound in BP off HCTZ.   Then would either increase ACE or add calcium channel blocker.  PLAN:     Low purine diet    Check uric acid    Stop hydrochlorothiazide    Nurse BP in 1 and 3 weeks.            HPI:  At  1-31-20 for gout of the right great toe.  Responded well to colchicine and indomethacin.  First episode.  Was given dietary info.    Cough came on like a cold with onset while flying home from [place] on 2-21-20.  Was in [place] for a week. ([facility])  Spent one week before that in [facility] ([place])  No fever or chills.  No shortness of breath.         Current Outpatient Medications on File Prior to Visit   Medication Sig Dispense Refill     aspirin 81 MG EC tablet Take 1 tablet (81 mg total) by mouth daily.  0     budesonide (PULMICORT FLEXHALER) 180 mcg/actuation inhaler inhale 1 puff by mouth twice daily. 1 Inhaler 2     cholecalciferol, vitamin D3, (VITAMIN D3) 2,000 unit Tab Take 2 tablets (4,000 Units total) by mouth daily. AFTER 3 MONTHS RECHECK VITAMIN D LEVEL  0     EPINEPHrine (EPIPEN/ADRENACLICK/AUVI-Q) 0.3 mg/0.3 mL injection Inject 0.3 mL (0.3 mg total) into the shoulder, thigh, or buttocks as needed for anaphylaxis. 2 Pre-filled Pen Syringe 0     lisinopril (PRINIVIL,ZESTRIL) 5 MG tablet TAKE 1 TABLET (5 MG) BY MOUTH ONCE DAILY 90 tablet 2     simvastatin (ZOCOR) 20 MG tablet TAKE 1 AND 1/2 TABLETS (30 MG) BY MOUTH ONCE DAILY 135 tablet 3     [DISCONTINUED] hydroCHLOROthiazide (HYDRODIURIL) 12.5 MG tablet Take 1 tablet (12.5 mg total) by mouth daily. 90 tablet 3     No current facility-administered medications on file prior to visit.        Pmh: reviewed  Psh: reviewed  Allergy:  reviewed      EXAM:    /72   Pulse 72   Temp 98.5  F (36.9  C) (Oral)   Resp 20   Wt (!) 257  lb 12.8 oz (116.9 kg)   SpO2 97%   BMI 35.21 kg/m       Wt Readings from Last 3 Encounters:   02/26/20 (!) 257 lb 12.8 oz (116.9 kg)   01/06/20 (!) 269 lb (122 kg)   11/22/19 (!) 267 lb 9.6 oz (121.4 kg)      GEN:   ALERT, NAD, ORIENTED TIMES THREE  NECK: SUPPLE WITHOUT ADENOPATHY OR THYROMEGALY  LUNGS: CTA  COR: RRR WITHOUT MURMUR  SKIN: NO RASH , ULCERS OR LESIONS NOTED  EXT: WITHOUT EDEMA/SWELLING    No results found for this or any previous visit (from the past 168 hour(s)).

## 2021-06-06 NOTE — TELEPHONE ENCOUNTER
[facility] PA team  [phone]  Pool: HE PA MED ([id number])          PA has been initiated.       PA form completed and faxed insurance via Cover My Meds     Key:  [id number] - PA Case ID: [id number] - Rx #: [id number]     Medication:    Drug  Simvastatin 20MG tablets    Insurance:  [employer]         Response will be received via fax and may take up to 5-10 business days depending on plan

## 2021-06-06 NOTE — PROGRESS NOTES
Follow Up Blood Pressure Check    Ulices Gilbert is a 54 y.o. male recommended to follow up for blood pressure check by Price Wolfe MD. Anihypertensive medications and adherence were verified: Yes.     Reason for visit: Follow up from last OV    PLAN:      Low purine diet     Check uric acid     Stop hydrochlorothiazide     Nurse BP in 1 and 3 weeks.         Medication change at last visit: Yes- stopped HCTZ    Today's Vitals:   Vitals:    03/13/20 1445   BP: 126/73   Pulse: 64       Home blood pressure readings brought in today:   NA    Lowest blood pressure today is less than 140/90 and they deny signs or symptoms of new onset: .  Please inform patient of his/her blood pressure today.  If they are asymptomatic, the patient is to continue current medications.  This message will be routed to their provider, and they will be notified if a change in medication is recommended.    ( may be deleted if not applicable) If lowest blood pressure is greater than 200/110, regardless if symptoms are present, patient needs to be evaluated by a provider today.    Sophie Woods    Current Outpatient Medications   Medication Sig Dispense Refill     aspirin 81 MG EC tablet Take 1 tablet (81 mg total) by mouth daily.  0     budesonide (PULMICORT FLEXHALER) 180 mcg/actuation inhaler inhale 1 puff by mouth twice daily. 1 Inhaler 2     cholecalciferol, vitamin D3, (VITAMIN D3) 2,000 unit Tab Take 2 tablets (4,000 Units total) by mouth daily. AFTER 3 MONTHS RECHECK VITAMIN D LEVEL  0     EPINEPHrine (EPIPEN/ADRENACLICK/AUVI-Q) 0.3 mg/0.3 mL injection Inject 0.3 mL (0.3 mg total) into the shoulder, thigh, or buttocks as needed for anaphylaxis. 2 Pre-filled Pen Syringe 0     lisinopril (PRINIVIL,ZESTRIL) 5 MG tablet TAKE 1 TABLET (5 MG) BY MOUTH ONCE DAILY 90 tablet 2     simvastatin (ZOCOR) 20 MG tablet TAKE 1 AND 1/2 TABLETS (30 MG) BY MOUTH ONCE DAILY 135 tablet 3     No current facility-administered medications for this  visit.

## 2021-06-07 NOTE — TELEPHONE ENCOUNTER
RN cannot approve Refill Request    RN can NOT refill this medication med is not covered by policy/route to provider     . Last office visit: 2/26/2020 Price Wolfe MD Last Physical: 11/22/2019 Last MTM visit: Visit date not found Last visit same specialty: 2/26/2020 Price Wolfe MD.  Next visit within 3 mo: Visit date not found  Next physical within 3 mo: Visit date not found      Cait Chong, [facility] Triage/Med Refill 3/26/2020    Requested Prescriptions   Pending Prescriptions Disp Refills     budesonide (PULMICORT FLEXHALER) 180 mcg/actuation inhaler [Pharmacy Med Name: Pulmicort Flexhaler Inhalation Aerosol Powder Breath Activated 180 MCG/ACT]  1     Sig: inhale 1 puff by mouth twice daily.       There is no refill protocol information for this order

## 2021-06-10 NOTE — TELEPHONE ENCOUNTER
Refill Approved    Rx renewed per Medication Renewal Policy. Medication was last renewed on 11/22/19.    Cait Chong, [facility] Triage/Med Refill 8/25/2020     Requested Prescriptions   Pending Prescriptions Disp Refills     lisinopriL (PRINIVIL,ZESTRIL) 5 MG tablet [Pharmacy Med Name: Lisinopril Oral Tablet 5 MG] 90 tablet 0     Sig: TAKE 1 TABLET (5 MG) BY MOUTH ONCE DAILY       Ace Inhibitors Refill Protocol Passed - 8/23/2020 11:14 AM        Passed - PCP or prescribing provider visit in past 12 months       Last office visit with prescriber/PCP: 2/26/2020 Price Wolfe MD OR same dept: 2/26/2020 Price Wolfe MD OR same specialty: 2/26/2020 Price Wolfe MD  Last physical: 11/22/2019 Last MTM visit: Visit date not found   Next visit within 3 mo: Visit date not found  Next physical within 3 mo: Visit date not found  Prescriber OR PCP: Price Wolfe MD  Last diagnosis associated with med order: 1. Essential hypertension  - lisinopriL (PRINIVIL,ZESTRIL) 5 MG tablet [Pharmacy Med Name: Lisinopril Oral Tablet 5 MG]; TAKE 1 TABLET (5 MG) BY MOUTH ONCE DAILY  Dispense: 90 tablet; Refill: 0    If protocol passes may refill for 12 months if within 3 months of last provider visit (or a total of 15 months).             Passed - Serum Potassium in past 12 months     Lab Results   Component Value Date    Potassium 4.0 01/06/2020             Passed - Blood pressure filed in past 12 months     BP Readings from Last 1 Encounters:   03/13/20 126/73             Passed - Serum Creatinine in past 12 months     Creatinine   Date Value Ref Range Status   01/06/2020 0.87 0.70 - 1.30 mg/dL Final

## 2021-06-12 NOTE — PROGRESS NOTES
MALE PREVENTATIVE EXAM    Assessment and Plan:       Encounter Diagnoses   Name Primary?     Essential hypertension, Controlled on lisinopril Yes     Colon adenomas      Hyperlipidemia, unspecified hyperlipidemia type, controlled on a statin      Vitamin D deficiency      Health care maintenance      Screening PSA (prostate specific antigen)      Class 1 obesity due to excess calories without serious comorbidity with body mass index (BMI) of 30.0 to 30.9 in adult       PLAN:   Five year follow up colonoscopy is due in 2 years    Fasting labs    Strongly consider cutting back on alcohol use.       Next follow up:  No follow-ups on file.    Immunization Review  Adult Imm Review: No immunizations due today  no nicotine; 10-20 drinks per week    I discussed the following with the patient:   Adult Healthy Living: Importance of regular exercise  Healthy nutrition    I have had an Advance Directives discussion with the patient.    Subjective:   Chief Complaint: Ulices Gilbert is an 55 y.o. male here for a preventative health visit.     HPI:  No recurrence of gout since off the HCTZ    Healthy Habits  Are you taking a daily aspirin? Yes  Do you typically exercising at least 40 min, 3-4 times per week?  Yes  Do you usually eat at least 4 servings of fruit and vegetables a day, include whole grains and fiber and avoid regularly eating high fat foods? Yes  Have you had an eye exam in the past two years? Yes  Do you see a dentist twice per year? once yearly  Do you have any concerns regarding sleep? No    Safety Screen  If you own firearms, are they secured in a locked gun cabinet or with trigger locks? Yes  Do you feel you are safe where you are living?: Yes (11/2/2020 10:54 AM)  Do you feel you are safe in your relationship(s)?: Yes (11/2/2020 10:54 AM)      Review of Systems:   Some am cough,  Some clear phlegm in the am.     Please see above.  The rest of the review of systems are negative for all systems.     Cancer  "Screening     Patient has no health maintenance due at this time          Patient Care Team:  Price Wolfe MD as PCP - General (Family Medicine)  Price Wolfe MD as Assigned PCP        History     Reviewed By Date/Time Sections Reviewed    Renee Villareal LPN 11/2/2020 10:54 AM Tobacco            Objective:   Vital Signs:   Visit Vitals  /83 (Patient Site: Left Arm, Patient Position: Sitting, Cuff Size: Adult Large)   Pulse 67   Temp 97.9  F (36.6  C) (Oral)   Resp 20   Ht 5' 11.75\" (1.822 m)   Wt (!) 241 lb 3.2 oz (109.4 kg)   BMI 32.94 kg/m       Wt Readings from Last 3 Encounters:   11/02/20 (!) 241 lb 3.2 oz (109.4 kg)   02/26/20 (!) 257 lb 12.8 oz (116.9 kg)   01/06/20 (!) 269 lb (122 kg)        PHYSICAL EXAM  /83 (Patient Site: Left Arm, Patient Position: Sitting, Cuff Size: Adult Large)   Pulse 67   Temp 97.9  F (36.6  C) (Oral)   Resp 20   Ht 5' 11.75\" (1.822 m)   Wt (!) 241 lb 3.2 oz (109.4 kg)   BMI 32.94 kg/m      General Appearance:    Alert, cooperative, no distress, appears stated age   Head:    Normocephalic, without obvious abnormality, atraumatic   Eyes:    PERRL, conjunctiva/corneas clear, EOM's intact, fundi     benign, both eyes        Ears:    Normal TM's and external ear canals, both ears   Nose:   Nares normal, septum midline, mucosa normal, no drainage    or sinus tenderness   Throat:   Lips, mucosa, and tongue normal; teeth and gums normal   Neck:   Supple, symmetrical, trachea midline, no adenopathy;        thyroid:  No enlargement/tenderness/nodules; no carotid    bruit or JVD   Back:     Symmetric, no curvature, ROM normal, no CVA tenderness   Lungs:     Clear to auscultation bilaterally, respirations unlabored   Chest wall:    No tenderness or deformity   Heart:    Regular rate and rhythm, S1 and S2 normal, no murmur, rub    or gallop   Abdomen:     Soft, non-tender, bowel sounds active all four quadrants,     no masses, no organomegaly "   Genitalia :  No exam      Rectal:   declined this year after discussion.   Will check annual PSA   Extremities:   Extremities normal, atraumatic, no cyanosis or edema       Skin:   Skin color, texture, turgor normal, no rashes or lesions   Lymph nodes:   Cervical, supraclavicular, and axillary nodes normal   Neurologic:   CNII-XII intact. Normal strength, sensation and reflexes       throughout       Lab Results   Component Value Date    PSA 0.6 11/22/2019    PSA 0.7 12/21/2018    PSA 0.6 11/13/2017     Lab Results   Component Value Date    HGBA1C 5.7 11/22/2019     Results for orders placed or performed in visit on 11/22/19   Comprehensive Metabolic Panel   Result Value Ref Range    Sodium 138 136 - 145 mmol/L    Potassium 4.1 3.5 - 5.0 mmol/L    Chloride 99 98 - 107 mmol/L    CO2 25 22 - 31 mmol/L    Anion Gap, Calculation 14 5 - 18 mmol/L    Glucose 85 70 - 125 mg/dL    BUN 18 8 - 22 mg/dL    Creatinine 0.86 0.70 - 1.30 mg/dL    GFR MDRD Af Amer >60 >60 mL/min/1.73m2    GFR MDRD Non Af Amer >60 >60 mL/min/1.73m2    Bilirubin, Total 0.7 0.0 - 1.0 mg/dL    Calcium 9.7 8.5 - 10.5 mg/dL    Protein, Total 8.1 (H) 6.0 - 8.0 g/dL    Albumin 4.4 3.5 - 5.0 g/dL    Alkaline Phosphatase 68 45 - 120 U/L    AST 30 0 - 40 U/L    ALT 56 (H) 0 - 45 U/L     Lab Results   Component Value Date    CHOL 199 11/22/2019    CHOL 209 (H) 12/07/2018    CHOL 164 11/13/2017     Lab Results   Component Value Date    HDL 47 11/22/2019    HDL 49 12/07/2018    HDL 47 11/13/2017     Lab Results   Component Value Date    LDLCALC 124 11/22/2019    LDLCALC 140 (H) 12/07/2018    LDLCALC 102 11/13/2017     Lab Results   Component Value Date    TRIG 142 11/22/2019    TRIG 99 12/07/2018    TRIG 73 11/13/2017     Vitamin D, Total (25-Hydroxy)   Date Value Ref Range Status   11/22/2019 44.9 30.0 - 80.0 ng/mL Final      No components found for: CHOLHDL      Additional Screenings Completed Today:

## 2021-06-12 NOTE — PATIENT INSTRUCTIONS - HE
Five year follow up colonoscopy is due in 2 years    Fasting labs    Strongly consider cutting back on alcohol use.

## 2021-06-13 NOTE — TELEPHONE ENCOUNTER
Refill Approved    Rx renewed per Medication Renewal Policy. Medication was last renewed on 11/22/19, last OV 11/2/20.    Yessenia Lara, [facility] Triage/Med Refill 11/15/2020     Requested Prescriptions   Pending Prescriptions Disp Refills     simvastatin (ZOCOR) 20 MG tablet [Pharmacy Med Name: Simvastatin Oral Tablet 20 MG] 135 tablet 0     Sig: TAKE 1&1/2 TABLETS (30 MG) BY MOUTH ONCE DAILY       Statins Refill Protocol (Hmg CoA Reductase Inhibitors) Passed - 11/11/2020  6:46 AM        Passed - PCP or prescribing provider visit in past 12 months      Last office visit with prescriber/PCP: 2/26/2020 Price Wolfe MD OR same dept: 2/26/2020 Price Wolfe MD OR same specialty: 2/26/2020 Price Wolfe MD  Last physical: 11/2/2020 Last MTM visit: Visit date not found   Next visit within 3 mo: Visit date not found  Next physical within 3 mo: Visit date not found  Prescriber OR PCP: Price Wolfe MD  Last diagnosis associated with med order: 1. Hyperlipidemia  - simvastatin (ZOCOR) 20 MG tablet [Pharmacy Med Name: Simvastatin Oral Tablet 20 MG]; TAKE 1&1/2 TABLETS (30 MG) BY MOUTH ONCE DAILY  Dispense: 135 tablet; Refill: 0    If protocol passes may refill for 12 months if within 3 months of last provider visit (or a total of 15 months).

## 2021-06-14 NOTE — TELEPHONE ENCOUNTER
RN cannot approve Refill Request    RN can NOT refill this medication med is not covered by policy/route to provider. Last office visit: 2/26/2020 Price Wolfe MD Last Physical: 11/2/2020 Last MTM visit: Visit date not found Last visit same specialty: 2/26/2020 Price Wolfe MD.  Next visit within 3 mo: Visit date not found  Next physical within 3 mo: Visit date not found      Cait Chong, [facility] Triage/Med Refill 1/7/2021    Requested Prescriptions   Pending Prescriptions Disp Refills     PULMICORT FLEXHALER 180 mcg/actuation inhaler [Pharmacy Med Name: Pulmicort Flexhaler Inhalation Aerosol Powder Breath Activated 180 MCG/ACT]  0     Sig: inhale 1 puff by mouth twice daily.       There is no refill protocol information for this order

## 2021-06-14 NOTE — PROGRESS NOTES
DIAGNOSIS:  1. Screening PSA (prostate specific antigen)  PSA, Annual Screen (Prostatic-Specific Antigen)   2. Asthma,STABLE budesonide (PULMICORT FLEXHALER) 180 mcg/actuation inhaler   3. Hyperlipidemia, LDL AT GOAL simvastatin (ZOCOR) 20 MG tablet    Lipid [place] FASTING    Comprehensive Metabolic Panel   4. Health care maintenance  Tdap vaccine,  8yo or older,  IM    Pneumococcal conjugate vaccine 13-valent 6wks-17yrs; >50yrs   5. Elevated liver enzymes, RECHECK    6. Obesity         PLAN:  Patient Instructions   Tdap update    Prevnar 13    Fasting labs including PSA    Schedule physical at [facility] that is covered by work    The following high BMI interventions were performed this visit: encouragement to exercise and dietary management education, guidance, and counseling   WORK ON THE NEXT 10 LBS OF WEIGHT LOSS TO HELP LOWER THE LIVER ENZYMES.   AVOID ALL ALCOHOL.              HPI: medcheck.   Uses inhaled steroid 2 puffs once daily.   Does a lot of throat clearing in the am.       Current Outpatient Prescriptions on File Prior to Visit   Medication Sig Dispense Refill     aspirin 81 MG EC tablet Take 1 tablet (81 mg total) by mouth daily. 150 tablet 2     [DISCONTINUED] PULMICORT FLEXHALER 180 mcg/actuation inhaler INHALE 2 PUFFS BY MOUTH TWO TIMES A DAY 1 Inhaler 2     [DISCONTINUED] simvastatin (ZOCOR) 20 MG tablet TAKE 1 AND 1/2 TABLETS (30 MG) BY MOUTH ONCE DAILY 135 tablet 3     No current facility-administered medications on file prior to visit.        Pmh: reviewed  Psh: reviewed  Allergy:  reviewed      EXAM:    /80 (Patient Site: Right Arm, Patient Position: Sitting, Cuff Size: Adult Large)  Pulse 76  Temp 98.5  F (36.9  C) (Oral)   Wt (!) 258 lb 9.6 oz (117.3 kg)  BMI 34.83 kg/m2  GEN:   ALERT, NAD, ORIENTED TIMES THREE  NECK: SUPPLE WITHOUT ADENOPATHY OR THYROMEGALY  LUNGS: CTA  COR: RRR WITHOUT MURMUR  EXT: WITHOUT EDEMA OR SWELLING    No results found for this or any previous visit (from  the past 168 hour(s)).       Lab Results   Component Value Date    PSA 1.0 11/22/2016     Lab Results   Component Value Date    CHOL 159 11/22/2016    CHOL 196 01/08/2016    CHOL 206 (H) 01/03/2014     Lab Results   Component Value Date    HDL 38 (L) 11/22/2016    HDL 36 (L) 01/08/2016    HDL 43 01/03/2014     Lab Results   Component Value Date    LDLCALC 102 11/22/2016    LDLCALC 137 (H) 01/08/2016    LDLCALC 127 01/03/2014     Lab Results   Component Value Date    TRIG 96 11/22/2016    TRIG 115 01/08/2016    TRIG 181 (H) 01/03/2014     Results for orders placed or performed in visit on 11/22/16   Comprehensive Metabolic Panel   Result Value Ref Range    Sodium 143 136 - 145 mmol/L    Potassium 3.9 3.5 - 5.0 mmol/L    Chloride 106 98 - 107 mmol/L    CO2 26 22 - 31 mmol/L    Anion Gap, Calculation 11 5 - 18 mmol/L    Glucose 75 70 - 125 mg/dL    BUN 20 8 - 22 mg/dL    Creatinine 0.81 0.70 - 1.30 mg/dL    GFR MDRD Af Amer >60 >60 mL/min/1.73m2    GFR MDRD Non Af Amer >60 >60 mL/min/1.73m2    Bilirubin, Total 0.3 0.0 - 1.0 mg/dL    Calcium 8.7 8.5 - 10.5 mg/dL    Protein, Total 7.4 6.0 - 8.0 g/dL    Albumin 3.9 3.5 - 5.0 g/dL    Alkaline Phosphatase 85 45 - 120 U/L    AST 50 (H) 0 - 40 U/L    ALT 61 (H) 0 - 45 U/L       No components found for: CHOLHDL

## 2021-06-15 NOTE — TELEPHONE ENCOUNTER
Refill Approved    Rx renewed per Medication Renewal Policy. Medication was last renewed on 8/25/20.    Price Montague, [facility] [place] Connection Triage/Med Refill 2/14/2021     Requested Prescriptions   Pending Prescriptions Disp Refills     lisinopriL (PRINIVIL,ZESTRIL) 5 MG tablet [Pharmacy Med Name: Lisinopril Oral Tablet 5 MG] 90 tablet 0     Sig: TAKE 1 TABLET (5 MG) BY MOUTH ONCE DAILY       [employer] Inhibitors Refill Protocol Passed - 2/13/2021 11:37 AM        Passed - PCP or prescribing provider visit in past 12 months       Last office visit with prescriber/PCP: 2/26/2020 Price Wolfe MD OR same dept: 2/26/2020 Price Wolfe MD OR same specialty: 2/26/2020 Price Wolfe MD  Last physical: 11/2/2020 Last MTM visit: Visit date not found   Next visit within 3 mo: Visit date not found  Next physical within 3 mo: Visit date not found  Prescriber OR PCP: Price Wolfe MD  Last diagnosis associated with med order: 1. Essential hypertension  - lisinopriL (PRINIVIL,ZESTRIL) 5 MG tablet [Pharmacy Med Name: Lisinopril Oral Tablet 5 MG]; TAKE 1 TABLET (5 MG) BY MOUTH ONCE DAILY  Dispense: 90 tablet; Refill: 0    If protocol passes may refill for 12 months if within 3 months of last provider visit (or a total of 15 months).             Passed - Serum Potassium in past 12 months     Lab Results   Component Value Date    Potassium 4.7 11/02/2020             Passed - Blood pressure filed in past 12 months     BP Readings from Last 1 Encounters:   11/02/20 121/83             Passed - Serum Creatinine in past 12 months     Creatinine   Date Value Ref Range Status   11/02/2020 0.83 0.70 - 1.30 mg/dL Final

## 2021-06-16 PROBLEM — D12.6 COLON ADENOMAS: Status: ACTIVE | Noted: 2017-08-18

## 2021-06-16 PROBLEM — E55.9 VITAMIN D DEFICIENCY: Status: ACTIVE | Noted: 2019-04-02

## 2021-06-16 PROBLEM — I10 ESSENTIAL HYPERTENSION: Status: ACTIVE | Noted: 2019-01-09

## 2021-06-16 NOTE — TELEPHONE ENCOUNTER
Telephone Encounter by Elis Hernandez at 2/24/2020  7:32 AM     Author: Elis Hernandez Service: -- Author Type: --    Filed: 2/24/2020  7:32 AM Encounter Date: 2/19/2020 Status: Signed    : Elis Hernandez APPROVED:    Approval start date: 01/21/2020  Approval end date:  02/19/2023    Pharmacy has been notified of approval and will contact patient when medication is ready for pickup.

## 2021-06-18 NOTE — LETTER
Letter by Price Wolfe MD at      Author: Price Wolfe MD Service: -- Author Type: --    Filed:  Encounter Date: 2/28/2019 Status: (Other)       Ulices Gilbert  [address] [facility] [address]             February 28, 2019         Dear Mr. Gilbert,    Below are the results from your recent visit:    Resulted Orders   Basic Metabolic Panel   Result Value Ref Range    Sodium 139 136 - 145 mmol/L    Potassium 4.4 3.5 - 5.0 mmol/L    Chloride 100 98 - 107 mmol/L    CO2 27 22 - 31 mmol/L    Anion Gap, Calculation 12 5 - 18 mmol/L    Glucose 92 70 - 125 mg/dL    Calcium 9.4 8.5 - 10.5 mg/dL    BUN 17 8 - 22 mg/dL    Creatinine 0.85 0.70 - 1.30 mg/dL    GFR MDRD Af Amer >60 >60 mL/min/1.73m2    GFR MDRD Non Af Amer >60 >60 mL/min/1.73m2    Narrative    Fasting Glucose reference range is 70-99 mg/dL per  American Diabetes Association (ADA) guidelines.       Hi Ulices:  Your potassium and kidney function have remained NORMAL.    Please call with questions or contact us using [employer].    Sincerely,        Electronically signed by Price Wolfe MD

## 2021-06-18 NOTE — LETTER
Letter by Price Wolfe MD at      Author: Price Wolfe MD Service: -- Author Type: --    Filed:  Encounter Date: 2/7/2019 Status: (Other)       Ulices Gilbert  [address] [facility] [address]             February 7, 2019         Dear Mr. Gilbert,    Below are the results from your recent visit:    Resulted Orders   Basic Metabolic Panel   Result Value Ref Range    Sodium 139 136 - 145 mmol/L    Potassium 4.1 3.5 - 5.0 mmol/L    Chloride 103 98 - 107 mmol/L    CO2 24 22 - 31 mmol/L    Anion Gap, Calculation 12 5 - 18 mmol/L    Glucose 86 70 - 125 mg/dL    Calcium 9.0 8.5 - 10.5 mg/dL    BUN 17 8 - 22 mg/dL    Creatinine 0.83 0.70 - 1.30 mg/dL    GFR MDRD Af Amer >60 >60 mL/min/1.73m2    GFR MDRD Non Af Amer >60 >60 mL/min/1.73m2    Narrative    Fasting Glucose reference range is 70-99 mg/dL per  American Diabetes Association (ADA) guidelines.       Hi Ulices:  Your potassium and kidney function are NORMAL.  Proceed with taking hydrochlorothiazide (HCTZ) 25 mg daily.  We will get the nurse BP check in a week and then see me in 3 weeks.  Continue to include salads and veggies in your diet to get your potassium.    Please call with questions or contact us using [employer].    Sincerely,        Electronically signed by Price Wolfe MD

## 2021-06-19 NOTE — LETTER
Letter by Price Wolfe MD at      Author: Price Wolfe MD Service: -- Author Type: --    Filed:  Encounter Date: 11/28/2019 Status: Signed         Ulices Gilbert  [address] [facility]  [place] [address]             November 28, 2019         Dear Mr. Gilbert,    Below are the results from your recent visit:    Resulted Orders   Lipid [place] FASTING   Result Value Ref Range    Cholesterol 199 <=199 mg/dL    Triglycerides 142 <=149 mg/dL    HDL Cholesterol 47 >=40 mg/dL    LDL Calculated 124 <=129 mg/dL    Patient Fasting > 8hrs? Yes    Comprehensive Metabolic Panel   Result Value Ref Range    Sodium 138 136 - 145 mmol/L    Potassium 4.1 3.5 - 5.0 mmol/L    Chloride 99 98 - 107 mmol/L    CO2 25 22 - 31 mmol/L    Anion Gap, Calculation 14 5 - 18 mmol/L    Glucose 85 70 - 125 mg/dL    BUN 18 8 - 22 mg/dL    Creatinine 0.86 0.70 - 1.30 mg/dL    GFR MDRD Af Amer >60 >60 mL/min/1.73m2    GFR MDRD Non Af Amer >60 >60 mL/min/1.73m2    Bilirubin, Total 0.7 0.0 - 1.0 mg/dL    Calcium 9.7 8.5 - 10.5 mg/dL    Protein, Total 8.1 (H) 6.0 - 8.0 g/dL    Albumin 4.4 3.5 - 5.0 g/dL    Alkaline Phosphatase 68 45 - 120 U/L    AST 30 0 - 40 U/L    ALT 56 (H) 0 - 45 U/L    Narrative    Fasting Glucose reference range is 70-99 mg/dL per  American Diabetes Association (ADA) guidelines.   PSA, Annual Screen (Prostatic-Specific Antigen)   Result Value Ref Range    PSA 0.6 0.0 - 3.5 ng/mL    Narrative    Method is Abbott Prostate-Specific Antigen (PSA)  Standard-WHO 1st International (90:10)   Vitamin D, Total (25-Hydroxy)   Result Value Ref Range    Vitamin D, Total (25-Hydroxy) 44.9 30.0 - 80.0 ng/mL    Narrative    Deficiency <10.0 ng/mL  Insufficiency 10.0-29.9 ng/mL  Sufficiency 30.0-80.0 ng/mL  Toxicity (possible) >100.0 ng/mL   Glycosylated Hemoglobin A1c   Result Value Ref Range    Hemoglobin A1c 5.7 3.5 - 6.0 %       Elmer Elena:  With your history of heart disease I would like to see your LDL much lower.  If you are  taking the statin as directed then I would suggest a switch to rosuvastatin (a stronger statin) to get your LDL much lower.  Just let me now if you would like me to make the change.  We would then get recheck fasting labs with a liver function 6 weeks after the change.    There is mild elevation of a single liver enzyme.  Continue to work towards the goal of weight loss.    The remaining labs, including your PSA, are normal and should be rechecked in one year.    Please call with questions or contact us using [employer].    Sincerely,        Electronically signed by Price Wolfe MD

## 2021-06-19 NOTE — LETTER
Letter by Price Wolfe MD at      Author: Price Wolfe MD Service: -- Author Type: --    Filed:  Encounter Date: 7/25/2019 Status: (Other)         Ulices Gilbert  [address] [facility] [address]             July 25, 2019         Dear Mr. Gilbert,    Below are the results from your recent visit:    Resulted Orders   Vitamin D, Total (25-Hydroxy)   Result Value Ref Range    Vitamin D, Total (25-Hydroxy) 39.9 30.0 - 80.0 ng/mL    Narrative    Deficiency <10.0 ng/mL  Insufficiency 10.0-29.9 ng/mL  Sufficiency 30.0-80.0 ng/mL  Toxicity (possible) >100.0 ng/mL       Hi Ulices:  Your Vitamin D level is now in the normal range (lower normal).  I would continue supplementing your Vitamin D at the same dosage you have been on.   We can recheck a level with your next PSA in Dec 2019 (at the time of a physical or medcheck).    Please call with questions or contact us using [employer].    Sincerely,        Electronically signed by Price Wolfe MD

## 2021-06-19 NOTE — LETTER
Letter by Price Wolfe MD at      Author: Price Wolfe MD Service: -- Author Type: --    Filed:  Encounter Date: 4/2/2019 Status: (Other)         Ulices Gilbert  [address] [facility] [address]             April 2, 2019         Dear Mr. Gilbert,    Below are the results from your recent visit:    Resulted Orders   Basic Metabolic Panel   Result Value Ref Range    Sodium 138 136 - 145 mmol/L    Potassium 4.0 3.5 - 5.0 mmol/L    Chloride 102 98 - 107 mmol/L    CO2 22 22 - 31 mmol/L    Anion Gap, Calculation 14 5 - 18 mmol/L    Glucose 146 (H) 70 - 125 mg/dL    Calcium 9.2 8.5 - 10.5 mg/dL    BUN 24 (H) 8 - 22 mg/dL    Creatinine 0.86 0.70 - 1.30 mg/dL    GFR MDRD Af Amer >60 >60 mL/min/1.73m2    GFR MDRD Non Af Amer >60 >60 mL/min/1.73m2    Narrative    Fasting Glucose reference range is 70-99 mg/dL per  American Diabetes Association (ADA) guidelines.   Vitamin D, Total (25-Hydroxy)   Result Value Ref Range    Vitamin D, Total (25-Hydroxy) 6.0 (L) 30.0 - 80.0 ng/mL    Narrative    Deficiency <10.0 ng/mL  Insufficiency 10.0-29.9 ng/mL  Sufficiency 30.0-80.0 ng/mL  Toxicity (possible) >100.0 ng/mL       Elmer Elena:  I assume you were non-fasting for these labs as the blood sugar was slightly up.  If you were fasting hen follow up is required.  Otherwise the potassium and kidney function are normal.    Your vitamin D level is VERY LOW! (SEE BELOW)  Let's recheck the vitamin D level in 3 months as suggested.  ++++++++++++++++++++++++++++++++++++++++++++++++++++++++++++++++++++++++++++++++++++++++++++++++++++++++++++++++++++++++++++++++++++++    YOUR VITAMIN D LEVEL IS LOW.    THIS CAN RESULT IN POOR CALCIUM ABSORPTION AND ULTIMATELY IN OSTEOPOROSIS.    PLEASE BEGIN VITAMIN D (OVER THE COUNTER) AT 4000 UNITS DAILY FOR 3 MONTH(S)  AND THEN 2000 UNITS DAILY THEREAFTER.    YOU ALSO SHOULD BE GETTING 1200 TO 1500 MG OF CALCIUM DAILY, THE EQUIVALENT OF 4 SERVINGS FROM THE [employer].    RECHECK   VITAMIN D LEVEL LEVEL IN 3 MONTHS.     Please call with questions or contact us using [employer].    Sincerely,        Electronically signed by Price Wolfe MD

## 2021-06-20 NOTE — LETTER
Letter by Price Wolfe MD at      Author: Price Wolfe MD Service: -- Author Type: --    Filed:  Encounter Date: 2/26/2020 Status: (Other)         Ulices Gilbert  [address] [facility]  [place] [address]             February 26, 2020         Dear Mr. Gilbert,    Below are the results from your recent visit:    Resulted Orders   Uric Acid   Result Value Ref Range    Uric Acid 7.9 3.0 - 8.0 mg/dL       Elmer Elena:  Your uric acid level is at the extreme upper end of the normal range.  People at that end of the range are at increased risk for attacks of gout.  I would like to recheck a uric acid after you have been off the hydrochlorothiazide for a month.   At the same time I would like to recheck your blood pressure to make sure it is ok off the HCTZ.    Please call with questions or contact us using [employer].    Sincerely,        Electronically signed by Price Wlofe MD

## 2021-06-20 NOTE — LETTER
Letter by Price Wolfe MD at      Author: Price Wolfe MD Service: -- Author Type: --    Filed:  Encounter Date: 1/7/2020 Status: Signed         Ulices Gilbert  [address] [facility] [address]             January 7, 2020         Dear Mr. Gilbert,    Below are the results from your recent visit:    Resulted Orders   Basic Metabolic Panel   Result Value Ref Range    Sodium 136 136 - 145 mmol/L    Potassium 4.0 3.5 - 5.0 mmol/L    Chloride 100 98 - 107 mmol/L    CO2 23 22 - 31 mmol/L    Anion Gap, Calculation 13 5 - 18 mmol/L    Glucose 89 70 - 125 mg/dL    Calcium 10.0 8.5 - 10.5 mg/dL    BUN 24 (H) 8 - 22 mg/dL    Creatinine 0.87 0.70 - 1.30 mg/dL    GFR MDRD Af Amer >60 >60 mL/min/1.73m2    GFR MDRD Non Af Amer >60 >60 mL/min/1.73m2    Narrative    Fasting Glucose reference range is 70-99 mg/dL per  American Diabetes Association (ADA) guidelines.       Hi Ulices  Your potassium, kidney function and blood sugar are all NORMAL.  It was nice seeing you yesterday in clinic.    Please call with questions or contact us using [employer].    Sincerely,        Electronically signed by Price Wolfe MD

## 2021-06-21 NOTE — LETTER
Letter by Price Wolfe MD at      Author: Price Wolfe MD Service: -- Author Type: --    Filed:  Encounter Date: 11/2/2020 Status: (Other)       My Asthma Action Plan     Name: Ulices Gilbert   YOB: 1965  Date: 11/2/2020   My doctor: Price Wolfe MD   My clinic: [facility]        My Rescue Medicine:   Albuterol (Proair/Ventolin/Proventil HFA) 2-4 puffs EVERY 4 HOURS as needed. Use a spacer if recommended by your provider.    CHRONIC USE OF PULMICORT 180 mcg ONE PUFF DAILY   My Asthma Severity:   Mild Persistent  Know your asthma triggers: smoke and upper respiratory infections             GREEN ZONE   Good Control    I feel good    No cough or wheeze    Can work, sleep and play without asthma symptoms     Take your asthma control medicine every day.     1. If exercise triggers your asthma, take your rescue medication    15 minutes before exercise or sports, and    During exercise if you have asthma symptoms  2. Spacer to use with inhaler: If you have a spacer, make sure to use it with your inhaler             YELLOW ZONE Getting Worse  I have ANY of these:    I do not feel good    Cough or wheeze    Chest feels tight    Wake up at night 1. Keep taking your Green Zone medications  2. Start taking your rescue medicine:    every 20 minutes for up to 1 hour. Then every 4 hours for 24-48 hours.  3. If you stay in the Yellow Zone for more than 12-24 hours, contact your doctor.  4. If you do not return to the Green Zone in 12-24 hours or you get worse, start taking your oral steroid medicine if prescribed by your provider.           RED ZONE Medical Alert - Get Help  I have ANY of these:    I feel awful    Medicine is not helping    Breathing getting harder    Trouble walking or talking    Nose opens wide to breathe     1. Take your rescue medicine NOW  2. If your provider has prescribed an oral steroid medicine, start taking it NOW  3. Call  your doctor NOW  4. If you are still in the Red Zone after 20 minutes and you have not reached your doctor:    Take your rescue medicine again and    Call 911 or go to the emergency room right away    See your regular doctor within 2 weeks of an Emergency Room or Urgent Care visit for follow-up treatment.          Annual Reminders:  Meet with Asthma Educator,  Flu Shot in the Fall, consider Pneumonia Vaccination for patients with asthma (aged 19 and older).    Pharmacy:   [id number] - [place], [place] - [address]  [address]  [place] [address]  Phone: [phone] Fax: [phone]      Electronically signed by Price Wolfe MD   Date: 11/02/20                      Asthma Triggers  How To Control Things That Make Your Asthma Worse    Triggers are things that make your asthma worse.  Look at the list below to help you find your triggers and what you can do about them.  You can help prevent asthma flare-ups by staying away from your triggers.      Trigger                                                          What you can do   Cigarette Smoke  Tobacco smoke can make asthma worse. Do not allow smoking in your home, car or around you.  Be sure no one smokes at a daria day care or school.  If you smoke, ask your health care provider for ways to help you quit.  Ask family members to quit too.  Ask your health care provider for a referral to Quit Plan to help you quit smoking, or call 3-657-588-PLAN.     Colds, Flu, Bronchitis  These are common triggers of asthma. Wash your hands often.  Dont touch your eyes, nose or mouth.  Get a flu shot every year.     Dust Mites  These are tiny bugs that live in cloth or carpet. They are too small to see. Wash sheets and blankets in hot water every week.   Encase pillows and mattress in dust mite proof covers.  Avoid having carpet if you can. If you have carpet, vacuum weekly.   Use a dust mask and HEPA vacuum.   Pollen and Outdoor  Mold  Some people are allergic to trees, grass, or weed pollen, or molds. Try to keep your windows closed.  Limit time out doors when pollen count is high.   Ask you health care provider about taking medicine during allergy season.     Animal Dander  Some people are allergic to skin flakes, urine or saliva from pets with fur or feathers. Keep pets with fur or feathers out of your home.    If you cant keep the pet outdoors, then keep the pet out of your bedroom.  Keep the bedroom door closed.  Keep pets off cloth furniture and away from stuffed toys.     Mice, Rats, and Cockroaches  Some people are allergic to the waste from these pests.   Cover food and garbage.  Clean up spills and food crumbs.  Store grease in the refrigerator.   Keep food out of the bedroom.   Indoor Mold  This can be a trigger if your home has high moisture. Fix leaking faucets, pipes, or other sources of water.   Clean moldy surfaces.  Dehumidify basement if it is damp and smelly.   Smoke, Strong Odors, and Sprays  These can reduce air quality. Stay away from strong odors and sprays, such as perfume, powder, hair spray, paints, smoke incense, paint, cleaning products, candles and new carpet.   Exercise or Sports  Some people with asthma have this trigger. Be active!  Ask your doctor about taking medicine before sports or exercise to prevent symptoms.    Warm up for 5-10 minutes before and after sports or exercise.     Other Triggers of Asthma  Cold air:  Cover your nose and mouth with a scarf.  Sometimes laughing or crying can be a trigger.  Some medicines and food can trigger asthma.

## 2021-06-21 NOTE — LETTER
Letter by Price Wolfe MD at      Author: Price Wolfe MD Service: -- Author Type: --    Filed:  Encounter Date: 11/3/2020 Status: (Other)         Ulices Gilbert  [address] [facility]  [place] [address]             November 3, 2020         Dear Mr. Gilbert,    Below are the results from your recent visit:    Resulted Orders   Lipid [place] FASTING   Result Value Ref Range    Cholesterol 214 (H) <=199 mg/dL    Triglycerides 95 <=149 mg/dL    HDL Cholesterol 56 >=40 mg/dL    LDL Calculated 139 (H) <=129 mg/dL    Patient Fasting > 8hrs? Yes    Comprehensive Metabolic Panel   Result Value Ref Range    Sodium 137 136 - 145 mmol/L    Potassium 4.7 3.5 - 5.0 mmol/L    Chloride 99 98 - 107 mmol/L    CO2 27 22 - 31 mmol/L    Anion Gap, Calculation 11 5 - 18 mmol/L    Glucose 93 70 - 125 mg/dL    BUN 21 8 - 22 mg/dL    Creatinine 0.83 0.70 - 1.30 mg/dL    GFR MDRD Af Amer >60 >60 mL/min/1.73m2    GFR MDRD Non Af Amer >60 >60 mL/min/1.73m2    Bilirubin, Total 0.7 0.0 - 1.0 mg/dL    Calcium 9.5 8.5 - 10.5 mg/dL    Protein, Total 7.9 6.0 - 8.0 g/dL    Albumin 4.4 3.5 - 5.0 g/dL    Alkaline Phosphatase 56 45 - 120 U/L    AST 26 0 - 40 U/L    ALT 35 0 - 45 U/L    Narrative    Fasting Glucose reference range is 70-99 mg/dL per  American Diabetes Association (ADA) guidelines.   PSA, Annual Screen (Prostatic-Specific Antigen)   Result Value Ref Range    PSA 0.9 0.0 - 3.5 ng/mL    Narrative    Method is Abbott Prostate-Specific Antigen (PSA)  Standard-WHO 1st International (90:10)   Glycosylated Hemoglobin A1c   Result Value Ref Range    Hemoglobin A1c 5.1 <=5.6 %      Comment:      Prediabetes:   HBA1c       5.7 to 6.4%        Diabetes:        HBA1c        >=6.5%   Patients with Hgb F >5%, total bilirubin >10.0 mg/dL, abnormal red cell turnover, severe renal or hepatic disease or malignancy should not have this A1C method used to diagnose or monitor diabetes.           Elmer Elena:  With your weight loss your A1C  went down nicely from 5.7 to 5.1 !  Again great job!    Your PSA went from 0.6 to 0.9.   This is still normal  (under the 0.35 increase that I use as a threshhold).  Your PSA was as high as 1.0 three  years ago.   You should have a PSA in one year.    The cholesterol panel is fair on your present dose of simvastatin.  We may want to consider a small increase in dose to 40 mg daily.   Let me know if you are up to trying that.   Or we can leave the dose where it is.     Your remaining labs, including liver enzymes,  are NORMAL  It was a pleasure seeing you yesterday.  Consider our discussion.      Please call with questions or contact us using [employer].    Sincerely,        Electronically signed by Price Wolfe MD

## 2021-06-22 NOTE — PROGRESS NOTES
DIAGNOSIS:  1. Screening PSA (prostate specific antigen)  PSA, Annual Screen (Prostatic-Specific Antigen)   2. Asthma  budesonide (PULMICORT FLEXHALER) 180 mcg/actuation inhaler   3. Hyperlipidemia  simvastatin (ZOCOR) 20 MG tablet    Lipid [place] FASTING    Comprehensive Metabolic Panel   4. Health care maintenance  Influenza, Recombinant, Inj, Quadrivalent, PF, 18+YRS   5. Class 2 obesity due to excess calories without serious comorbidity with body mass index (BMI) of 36.0 to 36.9 in adult     6. Elevated BP without diagnosis of hypertension         PLAN:  Patient Instructions   Fasting labs    Flu vaccine    The following high BMI interventions were performed this visit: encouragement to exercise and dietary management education, guidance, and counseling     Follow up for the PSA blood test after no sexual activity for 3 days    Knock out the salt (sunflower seeds)  Nurse BP check in 2 weeks.  If the BP is still elevated then make a follow up appt to address BP and probably start a med.    Approx 25 min spent with pt > 50% counseling and coordination of care       HPI:  Here for a arviem AG  Energy level ok. Commuting 2.5 hours daily.  Sleeping well and feels rested in the am.     ACT 23          Current Outpatient Medications on File Prior to Visit   Medication Sig Dispense Refill     EPINEPHrine (EPIPEN/ADRENACLICK/AUVI-Q) 0.3 mg/0.3 mL injection Inject 0.3 mg into the shoulder, thigh, or buttocks.       [DISCONTINUED] budesonide (PULMICORT FLEXHALER) 180 mcg/actuation inhaler Take one puff twice daily. 60 each 0     [DISCONTINUED] simvastatin (ZOCOR) 20 MG tablet TAKE 1 AND 1/2 TABLETS (30 MG) BY MOUTH ONCE DAILY. 135 tablet 0     No current facility-administered medications on file prior to visit.        Pmh: reviewed  Psh: reviewed  Allergy:  reviewed      EXAM:    BP (!) 151/102   Pulse 61   Temp 97.8  F (36.6  C) (Oral)   Resp 16   Wt (!) 272 lb 3.2 oz (123.5 kg)   BMI 36.66 kg/m     Wt Readings from  Last 3 Encounters:   12/07/18 (!) 272 lb 3.2 oz (123.5 kg)   11/13/17 (!) 258 lb 9.6 oz (117.3 kg)   11/22/16 (!) 271 lb (122.9 kg)      BP Readings from Last 3 Encounters:   12/07/18 (!) 151/102   11/13/17 150/80   12/23/16 138/82      GEN:   ALERT, NAD, ORIENTED TIMES THREE  NECK: SUPPLE WITHOUT ADENOPATHY OR THYROMEGALY  LUNGS: CTA  COR: RRR WITHOUT MURMUR  SKIN: NO RASH , ULCERS OR LESIONS NOTED  EXT: WITHOUT EDEMA OR SWELLING    No results found for this or any previous visit (from the past 168 hour(s)).       Lab Results   Component Value Date    PSA 0.6 11/13/2017    PSA 1.0 11/22/2016

## 2021-06-22 NOTE — PROGRESS NOTES
I met with Ulices Gilbert at the request of Dr Wolfe to recheck his blood pressure.  Blood pressure medications on the MAR were reviewed with patient.    Patient has taken all medications as per usual regimen: N/A  Patient reports tolerating them without any issues or concerns: N/A    Vitals:    12/21/18 1334 12/21/18 1337   BP: (!) 144/97 (!) 150/94   Pulse: (!) 58 (!) 59       After 5 minutes, the patient's blood pressure remained greater than or equal to 140/90.    Is the patient currently having any chest pain?  No  Does the patient currently have a headache?   No  Does the patient currently have any vision changes? No  Does the patient currently have any nausea? No  Does the patient currently have any abdominal pain? No    The previous encounter was reviewed.  The patient was discharged and the note will be sent to the provider for final review.     Pt not currently taking anything for HTN. Do you want him to come back for another nurse BP check or do you need to see him again?

## 2021-06-22 NOTE — PROGRESS NOTES
DIAGNOSIS:  1. Essential hypertension  hydroCHLOROthiazide (MICROZIDE) 12.5 mg capsule       PLAN:  Patient Instructions   Start hydrochlorothiazide 12.5 mg daily    Nurse BP check in one week    appt for BP check in 3 weeks at which time we will recheck the potassium    Avoid salt and alcohol    Work on continuing weight reduction          HPI:  Here for a BP check.    Has cut back on alcohol and is getting some exercise.  His mother had high BP.    Sleeps over well good at night.  Wife says he breathes deeply and  occas snores but no apnea.        Current Outpatient Medications on File Prior to Visit   Medication Sig Dispense Refill     budesonide (PULMICORT FLEXHALER) 180 mcg/actuation inhaler Take one puff twice daily. 1 Inhaler 3     EPINEPHrine (EPIPEN/ADRENACLICK/AUVI-Q) 0.3 mg/0.3 mL injection Inject 0.3 mg into the shoulder, thigh, or buttocks.       simvastatin (ZOCOR) 20 MG tablet TAKE 1 AND 1/2 TABLETS (30 MG) BY MOUTH ONCE DAILY. 135 tablet 3     No current facility-administered medications on file prior to visit.        Pmh: reviewed  Psh: reviewed  Allergy:  reviewed      EXAM:    BP (!) 164/104   Pulse (!) 58   Resp 16   Wt (!) 268 lb (121.6 kg)   BMI 36.10 kg/m     Wt Readings from Last 3 Encounters:   01/09/19 (!) 268 lb (121.6 kg)   12/07/18 (!) 272 lb 3.2 oz (123.5 kg)   11/13/17 (!) 258 lb 9.6 oz (117.3 kg)      GEN:   ALERT, NAD, ORIENTED TIMES THREE  NECK: SUPPLE WITHOUT ADENOPATHY OR THYROMEGALY  LUNGS: CTA  COR: RRR WITHOUT MURMUR  SKIN: NO RASH , ULCERS OR LESIONS NOTED  EXT:  1= EDEMA OF THE BILATER LE  EXT: WITHOUT EDEMA OR SWELLING  Results for orders placed or performed in visit on 12/07/18   Comprehensive Metabolic Panel   Result Value Ref Range    Sodium 141 136 - 145 mmol/L    Potassium 4.3 3.5 - 5.0 mmol/L    Chloride 103 98 - 107 mmol/L    CO2 23 22 - 31 mmol/L    Anion Gap, Calculation 15 5 - 18 mmol/L    Glucose 74 70 - 125 mg/dL    BUN 16 8 - 22 mg/dL    Creatinine 0.80 0.70  - 1.30 mg/dL    GFR MDRD Af Amer >60 >60 mL/min/1.73m2    GFR MDRD Non Af Amer >60 >60 mL/min/1.73m2    Bilirubin, Total 0.7 0.0 - 1.0 mg/dL    Calcium 9.6 8.5 - 10.5 mg/dL    Protein, Total 7.8 6.0 - 8.0 g/dL    Albumin 4.3 3.5 - 5.0 g/dL    Alkaline Phosphatase 79 45 - 120 U/L    AST 29 0 - 40 U/L    ALT 44 0 - 45 U/L       No results found for this or any previous visit (from the past 168 hour(s)).

## 2021-06-23 NOTE — PROGRESS NOTES
DIAGNOSIS:  1. Essential hypertension  Basic Metabolic Panel    hydroCHLOROthiazide (HYDRODIURIL) 25 MG tablet       PLAN:  Patient Instructions   Check BMP (non-fasting)    Increase hctz  To 25 mg daily    Nurse BP check in a week.    Follow up in 3-4 weeks     an OMRON BP monitor            HPI:  Here for a medcheck and has now been on a low dose of hctz for about a month.  During the first week there was some lightheadedness but not since.          Current Outpatient Medications on File Prior to Visit   Medication Sig Dispense Refill     budesonide (PULMICORT FLEXHALER) 180 mcg/actuation inhaler Take one puff twice daily. 1 Inhaler 3     EPINEPHrine (EPIPEN/ADRENACLICK/AUVI-Q) 0.3 mg/0.3 mL injection Inject 0.3 mg into the shoulder, thigh, or buttocks.       simvastatin (ZOCOR) 20 MG tablet TAKE 1 AND 1/2 TABLETS (30 MG) BY MOUTH ONCE DAILY. 135 tablet 3     [DISCONTINUED] hydroCHLOROthiazide (MICROZIDE) 12.5 mg capsule Take 1 capsule (12.5 mg total) by mouth daily. 30 capsule 1     No current facility-administered medications on file prior to visit.        Pmh: reviewed  Psh: reviewed  Allergy:  reviewed      EXAM:    BP (!) 128/91   Pulse 62   Resp 18   Ht 6' (1.829 m)   Wt (!) 268 lb (121.6 kg)   SpO2 98%   BMI 36.35 kg/m    GEN:   ALERT, NAD, ORIENTED TIMES THREE  NECK: SUPPLE WITHOUT ADENOPATHY OR THYROMEGALY  LUNGS: CTA  COR: RRR WITHOUT MURMUR  SKIN: NO RASH , ULCERS OR LESIONS NOTED  EXT: WITHOUT EDEMA OR SWELLING  Results for orders placed or performed in visit on 12/07/18   Comprehensive Metabolic Panel   Result Value Ref Range    Sodium 141 136 - 145 mmol/L    Potassium 4.3 3.5 - 5.0 mmol/L    Chloride 103 98 - 107 mmol/L    CO2 23 22 - 31 mmol/L    Anion Gap, Calculation 15 5 - 18 mmol/L    Glucose 74 70 - 125 mg/dL    BUN 16 8 - 22 mg/dL    Creatinine 0.80 0.70 - 1.30 mg/dL    GFR MDRD Af Amer >60 >60 mL/min/1.73m2    GFR MDRD Non Af Amer >60 >60 mL/min/1.73m2    Bilirubin, Total 0.7 0.0  - 1.0 mg/dL    Calcium 9.6 8.5 - 10.5 mg/dL    Protein, Total 7.8 6.0 - 8.0 g/dL    Albumin 4.3 3.5 - 5.0 g/dL    Alkaline Phosphatase 79 45 - 120 U/L    AST 29 0 - 40 U/L    ALT 44 0 - 45 U/L

## 2021-06-23 NOTE — PROGRESS NOTES
I met with Ulices Gilbert at the request of Dr Wolfe to recheck his blood pressure.  Blood pressure medications on the MAR were reviewed with patient.    Patient has taken all medications as per usual regimen: Yes  Patient reports tolerating them without any issues or concerns: Yes    Vitals:    01/18/19 1534 01/18/19 1542   BP: 149/90 (!) 142/93   Patient Site: Right Arm    Patient Position: Sitting    Cuff Size: Adult Large    Pulse:  63       After 5 minutes, the patient's blood pressure remained greater than or equal to 140/90.    Is the patient currently having any chest pain?  No  Does the patient currently have a headache?   No  Does the patient currently have any vision changes? No  Does the patient currently have any nausea? No  Does the patient currently have any abdominal pain? No    The previous encounter was reviewed.  The patient was discharged and the note will be sent to the provider for final review.

## 2021-06-23 NOTE — PROGRESS NOTES
Schedule folow up BP check with Dr Wolfe within the next 2 weeks.   We will check labs and adjust meds then if necessary.

## 2021-06-23 NOTE — PATIENT INSTRUCTIONS - HE
Check BMP (non-fasting)    Increase hctz  To 25 mg daily    Nurse BP check in a week.    Follow up in 3-4 weeks     an OMRON BP monitor

## 2021-06-24 NOTE — PATIENT INSTRUCTIONS - HE
check BMP    Add lisinopril 5 mg daily    Minimize salt    Continue to work on weight loss    Moderate caffeine    Limit alcohol    Nurse BP check in one week    appt in 3 weeks

## 2021-06-24 NOTE — PROGRESS NOTES
I met with Ulices Gilbert at the request of Dr. Wolfe to recheck his blood pressure.  Blood pressure medications on the MAR were reviewed with patient.    Patient has taken all medications as per usual regimen: Yes  Patient reports tolerating them without any issues or concerns: Yes    Vitals:    02/20/19 0847 02/20/19 0854   BP: (!) 140/94 143/88   Patient Site: Right Arm Right Arm   Patient Position: Sitting Sitting   Cuff Size: Adult Large Adult Regular   Pulse: 67 68       After 5 minutes, the patient's blood pressure remained greater than or equal to 140/90.    Is the patient currently having any chest pain?  No  Does the patient currently have a headache?   No  Does the patient currently have any vision changes? No  Does the patient currently have any nausea? No  Does the patient currently have any abdominal pain? No    The previous encounter was reviewed.  The patient was discharged and the note will be sent to the provider for final review.     Pt brought in his own machine that he uses at home, after our first check with our machine we tried his machine and it read 136/92. Very close to our numbers.  Pt states he has an appt next week with Dr. Wolfe.

## 2021-06-24 NOTE — PROGRESS NOTES
DIAGNOSIS:  1. Essential hypertension  Basic Metabolic Panel       PLAN:  Patient Instructions   check BMP    Add lisinopril 5 mg daily    Minimize salt    Continue to work on weight loss    Moderate caffeine    Limit alcohol    Nurse BP check in one week    appt in 3 weeks          HPI: f/u htn.  We increased hctz to 25 mg on 2-6-19.   No side effects.  Takes all meds in the morning.   His home BP machine runs slightly lower than clinic.  Getting 135/80's home bps  Has much less pitting edema in the lower legs at night.  Has gone to non-salted sunflower seeds.  Alcohol about 2 beers every other day.        Current Outpatient Medications on File Prior to Visit   Medication Sig Dispense Refill     budesonide (PULMICORT FLEXHALER) 180 mcg/actuation inhaler Take one puff twice daily. 1 Inhaler 3     EPINEPHrine (EPIPEN/ADRENACLICK/AUVI-Q) 0.3 mg/0.3 mL injection Inject 0.3 mg into the shoulder, thigh, or buttocks.       hydroCHLOROthiazide (HYDRODIURIL) 25 MG tablet Take 1 tablet (25 mg total) by mouth daily. 90 tablet 2     simvastatin (ZOCOR) 20 MG tablet TAKE 1 AND 1/2 TABLETS (30 MG) BY MOUTH ONCE DAILY. 135 tablet 3     No current facility-administered medications on file prior to visit.        Pmh: reviewed  Psh: reviewed  Allergy:  reviewed      EXAM:    BP (!) 141/93   Pulse 60   Resp 18   Wt (!) 269 lb 6.4 oz (122.2 kg)   BMI 36.54 kg/m     Wt Readings from Last 3 Encounters:   02/27/19 (!) 269 lb 6.4 oz (122.2 kg)   02/06/19 (!) 268 lb (121.6 kg)   01/09/19 (!) 268 lb (121.6 kg)      BP Readings from Last 3 Encounters:   02/27/19 (!) 141/93   02/20/19 143/88   02/06/19 (!) 128/91      GEN:   ALERT, NAD, ORIENTED TIMES THREE  NECK: SUPPLE WITHOUT ADENOPATHY OR THYROMEGALY  LUNGS: CTA  COR: RRR WITHOUT MURMUR  SKIN: NO RASH , ULCERS OR LESIONS NOTED  EXT: WITHOUT EDEMA OR SWELLING    No results found for this or any previous visit (from the past 168 hour(s)).    Results for orders placed or performed in  visit on 02/06/19   Basic Metabolic Panel   Result Value Ref Range    Sodium 139 136 - 145 mmol/L    Potassium 4.1 3.5 - 5.0 mmol/L    Chloride 103 98 - 107 mmol/L    CO2 24 22 - 31 mmol/L    Anion Gap, Calculation 12 5 - 18 mmol/L    Glucose 86 70 - 125 mg/dL    Calcium 9.0 8.5 - 10.5 mg/dL    BUN 17 8 - 22 mg/dL    Creatinine 0.83 0.70 - 1.30 mg/dL    GFR MDRD Af Amer >60 >60 mL/min/1.73m2    GFR MDRD Non Af Amer >60 >60 mL/min/1.73m2

## 2021-09-12 ENCOUNTER — HEALTH MAINTENANCE LETTER (OUTPATIENT)
Age: 56
End: 2021-09-12

## 2021-09-13 ENCOUNTER — TELEPHONE (OUTPATIENT)
Dept: FAMILY MEDICINE | Facility: CLINIC | Age: 56
End: 2021-09-13

## 2021-09-13 NOTE — TELEPHONE ENCOUNTER
Prior Authorization Request  Who s requesting:  Pharmacy  Pharmacy Name and Location: [facility][id number]  Medication Name: PULMICORT FLEXHALER 180 mcg/actuation inhaler  Insurance Plan: [employer]  Insurance Member ID Number: [id number]  CoverMyMeds Key: N/A  Informed patient that prior authorizations can take up to 10 business days for response:   Yes  Okay to leave a detailed message: Yes

## 2021-09-14 NOTE — TELEPHONE ENCOUNTER
PA Initiation    Medication: PULMICORT FLEXHALER 180 mcg/actuation inhaler   Insurance Company: [employer] - Phone [phone] Fax [phone]  Pharmacy Filling the Rx: [facility] #[id number] - [place] [address] [address]  Filling Pharmacy Phone: [phone]  Filling Pharmacy Fax:    Start Date: 9/14/2021

## 2021-09-14 NOTE — TELEPHONE ENCOUNTER
Prior Authorization Approval    Authorization Effective Date: 8/15/2021  Authorization Expiration Date: 9/14/2022  Medication: PULMICORT FLEXHALER 180 mcg/actuation inhaler--APPROVED  Approved Dose/Quantity:   Reference #:     Insurance Company: [employer] - Phone [phone] Fax [phone]  Expected CoPay:       CoPay Card Available:      Foundation Assistance Needed:    Which Pharmacy is filling the prescription (Not needed for infusion/clinic administered): [facility] #[id number] - [place] [address] [address]  Pharmacy Notified: Yes  Patient Notified: Yes **Instructed pharmacy to notify patient when script is ready to /ship.**

## 2021-11-16 DIAGNOSIS — I10 ESSENTIAL HYPERTENSION: ICD-10-CM

## 2021-11-16 DIAGNOSIS — E78.5 HYPERLIPIDEMIA: ICD-10-CM

## 2021-11-17 ENCOUNTER — MYC MEDICAL ADVICE (OUTPATIENT)
Dept: FAMILY MEDICINE | Facility: CLINIC | Age: 56
End: 2021-11-17
Payer: COMMERCIAL

## 2021-11-17 DIAGNOSIS — I10 ESSENTIAL HYPERTENSION: ICD-10-CM

## 2021-11-17 DIAGNOSIS — E78.5 HYPERLIPIDEMIA LDL GOAL <130: Primary | ICD-10-CM

## 2021-11-17 RX ORDER — LISINOPRIL 5 MG/1
TABLET ORAL
Qty: 90 TABLET | Refills: 0 | OUTPATIENT
Start: 2021-11-17

## 2021-11-17 RX ORDER — LISINOPRIL 5 MG/1
TABLET ORAL
Qty: 90 TABLET | Refills: 0 | Status: SHIPPED | OUTPATIENT
Start: 2021-11-17 | End: 2022-02-07

## 2021-11-17 RX ORDER — SIMVASTATIN 20 MG
TABLET ORAL
Qty: 135 TABLET | Refills: 0 | OUTPATIENT
Start: 2021-11-17

## 2021-11-17 RX ORDER — SIMVASTATIN 20 MG
TABLET ORAL
Qty: 135 TABLET | Refills: 0 | Status: SHIPPED | OUTPATIENT
Start: 2021-11-17 | End: 2022-02-07

## 2021-12-21 ENCOUNTER — OFFICE VISIT (OUTPATIENT)
Dept: FAMILY MEDICINE | Facility: CLINIC | Age: 56
End: 2021-12-21
Payer: COMMERCIAL

## 2021-12-21 VITALS
WEIGHT: 252 LBS | SYSTOLIC BLOOD PRESSURE: 118 MMHG | HEIGHT: 72 IN | RESPIRATION RATE: 16 BRPM | BODY MASS INDEX: 34.13 KG/M2 | HEART RATE: 64 BPM | DIASTOLIC BLOOD PRESSURE: 87 MMHG

## 2021-12-21 DIAGNOSIS — Z11.59 NEED FOR HEPATITIS C SCREENING TEST: ICD-10-CM

## 2021-12-21 DIAGNOSIS — Z00.00 HEALTHCARE MAINTENANCE: ICD-10-CM

## 2021-12-21 DIAGNOSIS — E66.811 CLASS 1 OBESITY DUE TO EXCESS CALORIES WITHOUT SERIOUS COMORBIDITY WITH BODY MASS INDEX (BMI) OF 34.0 TO 34.9 IN ADULT: ICD-10-CM

## 2021-12-21 DIAGNOSIS — J45.30 MILD PERSISTENT ASTHMA WITHOUT COMPLICATION: ICD-10-CM

## 2021-12-21 DIAGNOSIS — D12.6 COLON ADENOMAS: ICD-10-CM

## 2021-12-21 DIAGNOSIS — Z12.5 SCREENING PSA (PROSTATE SPECIFIC ANTIGEN): ICD-10-CM

## 2021-12-21 DIAGNOSIS — E78.5 HYPERLIPIDEMIA, UNSPECIFIED HYPERLIPIDEMIA TYPE: ICD-10-CM

## 2021-12-21 DIAGNOSIS — E66.09 CLASS 1 OBESITY DUE TO EXCESS CALORIES WITHOUT SERIOUS COMORBIDITY WITH BODY MASS INDEX (BMI) OF 34.0 TO 34.9 IN ADULT: ICD-10-CM

## 2021-12-21 DIAGNOSIS — Z11.4 SCREENING FOR HIV (HUMAN IMMUNODEFICIENCY VIRUS): ICD-10-CM

## 2021-12-21 DIAGNOSIS — I10 ESSENTIAL HYPERTENSION: ICD-10-CM

## 2021-12-21 DIAGNOSIS — I21.9 MYOCARDIAL INFARCTION, UNSPECIFIED MI TYPE, UNSPECIFIED ARTERY (H): ICD-10-CM

## 2021-12-21 LAB
ALBUMIN SERPL-MCNC: 4.4 G/DL (ref 3.5–5)
ALP SERPL-CCNC: 60 U/L (ref 45–120)
ALT SERPL W P-5'-P-CCNC: 49 U/L (ref 0–45)
ANION GAP SERPL CALCULATED.3IONS-SCNC: 13 MMOL/L (ref 5–18)
AST SERPL W P-5'-P-CCNC: 31 U/L (ref 0–40)
BILIRUB SERPL-MCNC: 0.7 MG/DL (ref 0–1)
BUN SERPL-MCNC: 17 MG/DL (ref 8–22)
CALCIUM SERPL-MCNC: 10.4 MG/DL (ref 8.5–10.5)
CHLORIDE BLD-SCNC: 101 MMOL/L (ref 98–107)
CHOLEST SERPL-MCNC: 213 MG/DL
CO2 SERPL-SCNC: 24 MMOL/L (ref 22–31)
CREAT SERPL-MCNC: 0.81 MG/DL (ref 0.7–1.3)
ERYTHROCYTE [DISTWIDTH] IN BLOOD BY AUTOMATED COUNT: 12.4 % (ref 10–15)
FASTING STATUS PATIENT QL REPORTED: YES
GFR SERPL CREATININE-BSD FRML MDRD: >90 ML/MIN/1.73M2
GLUCOSE BLD-MCNC: 115 MG/DL (ref 70–125)
HCT VFR BLD AUTO: 47.4 % (ref 40–53)
HCV AB SERPL QL IA: NONREACTIVE
HDLC SERPL-MCNC: 55 MG/DL
HGB BLD-MCNC: 15.9 G/DL (ref 13.3–17.7)
HIV 1+2 AB+HIV1 P24 AG SERPL QL IA: NEGATIVE
LDLC SERPL CALC-MCNC: 129 MG/DL
MCH RBC QN AUTO: 33.8 PG (ref 26.5–33)
MCHC RBC AUTO-ENTMCNC: 33.5 G/DL (ref 31.5–36.5)
MCV RBC AUTO: 101 FL (ref 78–100)
PLATELET # BLD AUTO: 208 10E3/UL (ref 150–450)
POTASSIUM BLD-SCNC: 4.5 MMOL/L (ref 3.5–5)
PROT SERPL-MCNC: 8 G/DL (ref 6–8)
PSA SERPL-MCNC: 0.79 UG/L (ref 0–3.5)
RBC # BLD AUTO: 4.71 10E6/UL (ref 4.4–5.9)
SODIUM SERPL-SCNC: 138 MMOL/L (ref 136–145)
TRIGL SERPL-MCNC: 147 MG/DL
WBC # BLD AUTO: 6.2 10E3/UL (ref 4–11)

## 2021-12-21 PROCEDURE — 99214 OFFICE O/P EST MOD 30 MIN: CPT | Mod: 25 | Performed by: FAMILY MEDICINE

## 2021-12-21 PROCEDURE — 80053 COMPREHEN METABOLIC PANEL: CPT | Performed by: FAMILY MEDICINE

## 2021-12-21 PROCEDURE — 36415 COLL VENOUS BLD VENIPUNCTURE: CPT | Performed by: FAMILY MEDICINE

## 2021-12-21 PROCEDURE — 85027 COMPLETE CBC AUTOMATED: CPT | Performed by: FAMILY MEDICINE

## 2021-12-21 PROCEDURE — 87389 HIV-1 AG W/HIV-1&-2 AB AG IA: CPT | Performed by: FAMILY MEDICINE

## 2021-12-21 PROCEDURE — 99396 PREV VISIT EST AGE 40-64: CPT | Performed by: FAMILY MEDICINE

## 2021-12-21 PROCEDURE — G0103 PSA SCREENING: HCPCS | Performed by: FAMILY MEDICINE

## 2021-12-21 PROCEDURE — 80061 LIPID PANEL: CPT | Performed by: FAMILY MEDICINE

## 2021-12-21 PROCEDURE — 86803 HEPATITIS C AB TEST: CPT | Performed by: FAMILY MEDICINE

## 2021-12-21 ASSESSMENT — ENCOUNTER SYMPTOMS
SHORTNESS OF BREATH: 0
DYSURIA: 0
FEVER: 0
ABDOMINAL PAIN: 0
COUGH: 0
HEARTBURN: 0
CHILLS: 0
FREQUENCY: 0
SORE THROAT: 0
PALPITATIONS: 0
NERVOUS/ANXIOUS: 0
CONSTIPATION: 0
HEMATOCHEZIA: 0
PARESTHESIAS: 0
HEADACHES: 0
JOINT SWELLING: 0
HEMATURIA: 0
DIZZINESS: 0
WEAKNESS: 0
EYE PAIN: 0
NAUSEA: 0
ARTHRALGIAS: 0
DIARRHEA: 0
MYALGIAS: 0

## 2021-12-21 ASSESSMENT — ASTHMA QUESTIONNAIRES
QUESTION_1 LAST FOUR WEEKS HOW MUCH OF THE TIME DID YOUR ASTHMA KEEP YOU FROM GETTING AS MUCH DONE AT WORK, SCHOOL OR AT HOME: NONE OF THE TIME
QUESTION_5 LAST FOUR WEEKS HOW WOULD YOU RATE YOUR ASTHMA CONTROL: WELL CONTROLLED
QUESTION_2 LAST FOUR WEEKS HOW OFTEN HAVE YOU HAD SHORTNESS OF BREATH: NOT AT ALL
QUESTION_4 LAST FOUR WEEKS HOW OFTEN HAVE YOU USED YOUR RESCUE INHALER OR NEBULIZER MEDICATION (SUCH AS ALBUTEROL): NOT AT ALL
QUESTION_3 LAST FOUR WEEKS HOW OFTEN DID YOUR ASTHMA SYMPTOMS (WHEEZING, COUGHING, SHORTNESS OF BREATH, CHEST TIGHTNESS OR PAIN) WAKE YOU UP AT NIGHT OR EARLIER THAN USUAL IN THE MORNING: ONCE OR TWICE
ACT_TOTALSCORE: 23

## 2021-12-21 ASSESSMENT — MIFFLIN-ST. JEOR: SCORE: 2003.12

## 2021-12-21 NOTE — LETTER
December 22, 2021      Ulices Gilbert  [address] [address]        Dear ,  We are writing to inform you of your test results.    Hi Ulices:  Your PSA is NORMAL and stable.   The PSA should be rechecked in one year.  Your universal screening for HIV and Hepatitis C was NEGATIVE.  The blood sugar was in the pre diabetic range.  Work on weight loss and then come in for a weight check and A1C in 3 months.  There is negligible elevation of a single liver enzyme.  This will likely return to normal with weight loss.  The cholesterol panel is excellent and the remaining labs are NORMAL.   It was a pleasure seeing you in clinic recently.      Resulted Orders   HIV Antigen Antibody Combo   Result Value Ref Range    HIV Antigen Antibody Combo Negative Negative   Hepatitis C Screen Reflex to HCV RNA Quant and Genotype   Result Value Ref Range    Hepatitis C Antibody Nonreactive Nonreactive    Narrative    Assay performance characteristics have not been established for newborns, infants, and children.   PSA, screen   Result Value Ref Range    Prostate Specific Antigen Screen 0.79 0.00 - 3.50 ug/L    Narrative    Assay Method is Abbott Prostate-Specific Antigen (PSA)  Standard-WHO 1st International (90:10)   Lipid panel reflex to direct LDL Fasting   Result Value Ref Range    Cholesterol 213 (H) <=199 mg/dL    Triglycerides 147 <=149 mg/dL    Direct Measure HDL 55 >=40 mg/dL      Comment:      HDL Cholesterol Reference Range:     0-2 years:   No reference ranges established for patients under 2 years old  at [facility] for lipid analytes.    2-8 years:  Greater than 45 mg/dL     18 years and older:   Female: Greater than or equal to 50 mg/dL   Male:   Greater than or equal to 40 mg/dL    LDL Cholesterol Calculated 129 <=129 mg/dL    Patient Fasting > 8hrs? Yes    Comprehensive metabolic panel (BMP + Alb, Alk Phos, ALT, AST, Total. Bili, TP)   Result Value Ref Range    Sodium 138 136 - 145  mmol/L    Potassium 4.5 3.5 - 5.0 mmol/L    Chloride 101 98 - 107 mmol/L    Carbon Dioxide (CO2) 24 22 - 31 mmol/L    Anion Gap 13 5 - 18 mmol/L    Urea Nitrogen 17 8 - 22 mg/dL    Creatinine 0.81 0.70 - 1.30 mg/dL    Calcium 10.4 8.5 - 10.5 mg/dL    Glucose 115 70 - 125 mg/dL    Alkaline Phosphatase 60 45 - 120 U/L    AST 31 0 - 40 U/L    ALT 49 (H) 0 - 45 U/L    Protein Total 8.0 6.0 - 8.0 g/dL    Albumin 4.4 3.5 - 5.0 g/dL    Bilirubin Total 0.7 0.0 - 1.0 mg/dL    GFR Estimate >90 >60 mL/min/1.73m2      Comment:      Effective December 21, 2021 eGFRcr in adults is calculated using the 2021 CKD-EPI creatinine equation which includes age and gender (Tati blackburn al., [facility], DOI: 10.1056/YTKCtx2110610)   CBC with platelets   Result Value Ref Range    WBC Count 6.2 4.0 - 11.0 10e3/uL    RBC Count 4.71 4.40 - 5.90 10e6/uL    Hemoglobin 15.9 13.3 - 17.7 g/dL    Hematocrit 47.4 40.0 - 53.0 %     (H) 78 - 100 fL    MCH 33.8 (H) 26.5 - 33.0 pg    MCHC 33.5 31.5 - 36.5 g/dL    RDW 12.4 10.0 - 15.0 %    Platelet Count 208 150 - 450 10e3/uL       If you have any questions or concerns, please call the clinic at the number listed above.       Sincerely,      Price Wolfe MD

## 2021-12-21 NOTE — PATIENT INSTRUCTIONS
Colonoscopy due in Aug 2022    Fasting labs    Try otc melatonin 5-10 mg at bedtime    The following high BMI interventions were performed this visit: encouragement to exercise and dietary management education, guidance, and counseling     WORK ON LOSING 20 LBS

## 2021-12-21 NOTE — PROGRESS NOTES
SUBJECTIVE:   CC: Ulices Gilbert is an 56 year old male who presents for preventative health visit.       Patient has been advised of split billing requirements and indicates understanding: Yes  Healthy Habits:     Getting at least 3 servings of Calcium per day:  Yes    Bi-annual eye exam:  Yes    Dental care twice a year:  NO    Sleep apnea or symptoms of sleep apnea:  None    Diet:  Carbohydrate counting and Breakfast skipped    Frequency of exercise:  2-3 days/week    Duration of exercise:  15-30 minutes    Taking medications regularly:  Yes    Medication side effects:  None    PHQ-2 Total Score: 0    Additional concerns today:  Yes        Today's PHQ-2 Score:   PHQ-2 ( 1999 [employer]) 12/21/2021   Q1: Little interest or pleasure in doing things 0   Q2: Feeling down, depressed or hopeless 0   PHQ-2 Score 0   Q1: Little interest or pleasure in doing things -   Q2: Feeling down, depressed or hopeless -   PHQ-2 Score -       Abuse: Current or Past(Physical, Sexual or Emotional)- No  Do you feel safe in your environment? Yes        Social History     Tobacco Use     Smoking status: Never Smoker     Smokeless tobacco: Never Used   Substance Use Topics     Alcohol use: Not on file     NEVER SMOKED.  REMOTE HX OF CHEW  ALCOHOL A COUPLE NIGHTS PER WEEK (COCKTAILS OR WINE WITH DINNER)    Alcohol Use 12/20/2021   Prescreen: >3 drinks/day or >7 drinks/week? Yes   AUDIT SCORE  3       Last PSA:   Prostate Specific Antigen Screen   Date Value Ref Range Status   11/02/2020 0.9 0.0 - 3.5 ng/mL Final                       Review of Systems   Constitutional: Negative for chills and fever.   HENT: Negative for congestion, ear pain, hearing loss and sore throat.    Eyes: Negative for pain and visual disturbance.   Respiratory: Negative for cough and shortness of breath.    Cardiovascular: Negative for chest pain, palpitations and peripheral edema.   Gastrointestinal: Negative for abdominal pain, constipation, diarrhea, heartburn,  "hematochezia and nausea.   Genitourinary: Negative for dysuria, frequency, genital sores, hematuria, impotence, penile discharge and urgency.   Musculoskeletal: Negative for arthralgias, joint swelling and myalgias.   Skin: Negative for rash.   Neurological: Negative for dizziness, weakness, headaches and paresthesias.   Psychiatric/Behavioral: Negative for mood changes. The patient is not nervous/anxious.      Some morning phlegm  Right index finger mucocele    OBJECTIVE:   /87   Pulse 64   Resp 16   Ht 1.816 m (5' 11.5\")   Wt 114.3 kg (252 lb)   BMI 34.66 kg/m      Wt Readings from Last 4 Encounters:   12/21/21 114.3 kg (252 lb)   11/02/20 109.4 kg (241 lb 3.2 oz)   02/26/20 116.9 kg (257 lb 12.8 oz)   01/06/20 122 kg (269 lb)     Physical Exam  GENERAL: healthy, alert and no distress  EYES: Eyes grossly normal to inspection, PERRL and conjunctivae and sclerae normal  HENT: ear canals and TM's normal, nose and mouth without ulcers or lesions  NECK: no adenopathy, no asymmetry, masses, or scars and thyroid normal to palpation  RESP: lungs clear to auscultation - no rales, rhonchi or wheezes  CV: regular rate and rhythm, normal S1 S2, no S3 or S4, no murmur, click or rub, no peripheral edema and peripheral pulses strong  ABDOMEN: soft, nontender, no hepatosplenomegaly, no masses and bowel sounds normal  RECTAL: exam declined in lieu of PSA screening  MS: no gross musculoskeletal defects noted, no edema  SKIN: no suspicious lesions or rashes  NEURO: Normal strength and tone, mentation intact and speech normal  PSYCH: mentation appears normal, affect normal/bright    ACT Total Scores 11/22/2019 11/2/2020 12/21/2021   ACT TOTAL SCORE (Goal Greater than or Equal to 20) 20 24 23   In the past 12 months, how many times did you visit the emergency room for your asthma without being admitted to the hospital? 0 0 0   In the past 12 months, how many times were you hospitalized overnight because of your asthma? 0 0 " 0       Prostate Specific Antigen Screen   Date Value Ref Range Status   11/02/2020 0.9 0.0 - 3.5 ng/mL Final     Recent Labs   Lab Test 11/02/20  1123 11/22/19  1229   CHOL 214* 199   HDL 56 47   * 124   TRIG 95 142     Last Comprehensive Metabolic Panel:  Sodium   Date Value Ref Range Status   11/02/2020 137 136 - 145 mmol/L Final     Potassium   Date Value Ref Range Status   11/02/2020 4.7 3.5 - 5.0 mmol/L Final     Chloride   Date Value Ref Range Status   11/02/2020 99 98 - 107 mmol/L Final     Carbon Dioxide (CO2)   Date Value Ref Range Status   11/02/2020 27 22 - 31 mmol/L Final     Anion Gap   Date Value Ref Range Status   11/02/2020 11 5 - 18 mmol/L Final     Glucose   Date Value Ref Range Status   11/02/2020 93 70 - 125 mg/dL Final     Urea Nitrogen   Date Value Ref Range Status   11/02/2020 21 8 - 22 mg/dL Final     Creatinine   Date Value Ref Range Status   11/02/2020 0.83 0.70 - 1.30 mg/dL Final     GFR Estimate   Date Value Ref Range Status   11/02/2020 >60 >60 mL/min/1.73m2 Final     Calcium   Date Value Ref Range Status   11/02/2020 9.5 8.5 - 10.5 mg/dL Final     Bilirubin Total   Date Value Ref Range Status   11/02/2020 0.7 0.0 - 1.0 mg/dL Final     Alkaline Phosphatase   Date Value Ref Range Status   11/02/2020 56 45 - 120 U/L Final     ALT   Date Value Ref Range Status   11/02/2020 35 0 - 45 U/L Final     AST   Date Value Ref Range Status   11/02/2020 26 0 - 40 U/L Final         ASSESSMENT/PLAN:       ICD-10-CM    1. Healthcare maintenance  Z00.00 REVIEW OF HEALTH MAINTENANCE PROTOCOL ORDERS     CBC with platelets   2. Screening for HIV (human immunodeficiency virus)  Z11.4 HIV Antigen Antibody Combo   3. Need for hepatitis C screening test  Z11.59 Hepatitis C Screen Reflex to HCV RNA Quant and Genotype   4. Essential hypertension,controlled I10    5. Colon adenomas  D12.6    6. Hyperlipidemia, unspecified hyperlipidemia type, tolerating statin E78.5 Lipid panel reflex to direct LDL Fasting  "    Comprehensive metabolic panel (BMP + Alb, Alk Phos, ALT, AST, Total. Bili, TP)   7. Myocardial infarction, unspecified MI type, unspecified artery (H), no atherosclerosis I21.9    8. Mild persistent asthma without complication,well controlled J45.30    9. Screening PSA (prostate specific antigen)  Z12.5 PSA, screen   10. Class 1 obesity due to excess calories without serious comorbidity with body mass index (BMI) of 34.0 to 34.9 in adult  E66.09     Z68.34        PLAN:   Colonoscopy due in Aug 2022    Fasting labs    Try otc melatonin 5-10 mg at bedtime    The following high BMI interventions were performed this visit: encouragement to exercise and dietary management education, guidance, and counseling     WORK ON LOSING 20 LBS        Patient has been advised of split billing requirements and indicates understanding: Yes  COUNSELING:   Reviewed preventive health counseling, as reflected in patient instructions       Regular exercise       Healthy diet/nutrition       Colon cancer screening       Prostate cancer screening    Estimated body mass index is 34.66 kg/m  as calculated from the following:    Height as of this encounter: 1.816 m (5' 11.5\").    Weight as of this encounter: 114.3 kg (252 lb).         He reports that he has never smoked. He has never used smokeless tobacco.          Price Wolfe MD  [facility]  "

## 2021-12-22 ASSESSMENT — ASTHMA QUESTIONNAIRES: ACT_TOTALSCORE: 23

## 2022-01-03 DIAGNOSIS — J45.30 MILD PERSISTENT ASTHMA WITHOUT COMPLICATION: Primary | ICD-10-CM

## 2022-01-03 RX ORDER — FLUTICASONE PROPIONATE 110 UG/1
1 AEROSOL, METERED RESPIRATORY (INHALATION) 2 TIMES DAILY
Qty: 12 G | Refills: 3 | Status: SHIPPED | OUTPATIENT
Start: 2022-01-03 | End: 2023-03-13

## 2022-02-05 DIAGNOSIS — E78.5 HYPERLIPIDEMIA LDL GOAL <130: ICD-10-CM

## 2022-02-05 DIAGNOSIS — I10 ESSENTIAL HYPERTENSION: ICD-10-CM

## 2022-02-07 RX ORDER — LISINOPRIL 5 MG/1
TABLET ORAL
Qty: 90 TABLET | Refills: 3 | Status: SHIPPED | OUTPATIENT
Start: 2022-02-07 | End: 2023-02-14

## 2022-02-07 RX ORDER — SIMVASTATIN 20 MG
TABLET ORAL
Qty: 135 TABLET | Refills: 3 | Status: SHIPPED | OUTPATIENT
Start: 2022-02-07 | End: 2023-02-09

## 2022-02-07 NOTE — TELEPHONE ENCOUNTER
"Last Written Prescription Date:  11/17/21  Last Fill Quantity: 90/135,  # refills: 0   Last office visit provider:  12/21/21     Requested Prescriptions   Pending Prescriptions Disp Refills     simvastatin (ZOCOR) 20 MG tablet [Pharmacy Med Name: Simvastatin Oral Tablet 20 MG] 135 tablet 0     Sig: TAKE 1&1/2 TABLETS (30 MG) BY MOUTH ONCE DAILY       Statins Protocol Passed - 2/5/2022  1:25 PM        Passed - LDL on file in past 12 months     Recent Labs   Lab Test 12/21/21  0940                Passed - No abnormal creatine kinase in past 12 months     No lab results found.             Passed - Recent (12 mo) or future (30 days) visit within the authorizing provider's specialty     Patient has had an office visit with the authorizing provider or a provider within the authorizing providers department within the previous 12 mos or has a future within next 30 days. See \"Patient Info\" tab in inbasket, or \"Choose Columns\" in Meds & Orders section of the refill encounter.              Passed - Medication is active on med list        Passed - Patient is age 18 or older           lisinopril (ZESTRIL) 5 MG tablet [Pharmacy Med Name: Lisinopril Oral Tablet 5 MG] 90 tablet 0     Sig: TAKE 1 TABLET (5 MG) BY MOUTH ONCE DAILY       ACE Inhibitors (Including Combos) Protocol Passed - 2/5/2022  1:25 PM        Passed - Blood pressure under 140/90 in past 12 months     BP Readings from Last 3 Encounters:   12/21/21 118/87   11/02/20 121/83   03/13/20 126/73                 Passed - Recent (12 mo) or future (30 days) visit within the authorizing provider's specialty     Patient has had an office visit with the authorizing provider or a provider within the authorizing providers department within the previous 12 mos or has a future within next 30 days. See \"Patient Info\" tab in inbasket, or \"Choose Columns\" in Meds & Orders section of the refill encounter.              Passed - Medication is active on med list        Passed - " Patient is age 18 or older        Passed - Normal serum creatinine on file in past 12 months     Recent Labs   Lab Test 12/21/21  0940   CR 0.81       Ok to refill medication if creatinine is low          Passed - Normal serum potassium on file in past 12 months     Recent Labs   Lab Test 12/21/21  0940   POTASSIUM 4.5                  Price Montague RN 02/07/22 1:17 PM

## 2022-09-15 ENCOUNTER — TRANSFERRED RECORDS (OUTPATIENT)
Dept: HEALTH INFORMATION MANAGEMENT | Facility: CLINIC | Age: 57
End: 2022-09-15

## 2022-11-19 ENCOUNTER — HEALTH MAINTENANCE LETTER (OUTPATIENT)
Age: 57
End: 2022-11-19

## 2023-02-06 ENCOUNTER — TELEPHONE (OUTPATIENT)
Dept: FAMILY MEDICINE | Facility: CLINIC | Age: 58
End: 2023-02-06

## 2023-02-06 DIAGNOSIS — E78.5 HYPERLIPIDEMIA LDL GOAL <130: ICD-10-CM

## 2023-02-06 NOTE — TELEPHONE ENCOUNTER
Prior Authorization Request  Who s requesting:  Pharmacy  Pharmacy Name and Location: [id number]  Medication Name: Simvastatin (ZOCOR) 20 mg tablet (30 mg daily)  Insurance Plan: [employer]  Insurance Member ID Number: [id number]   CoverMyMeds Key: [id number]  Informed patient that prior authorizations can take up to 10 business days for response: Yes  Okay to leave a detailed message: Yes

## 2023-02-09 DIAGNOSIS — E78.5 HYPERLIPIDEMIA LDL GOAL <130: ICD-10-CM

## 2023-02-09 RX ORDER — SIMVASTATIN 20 MG
TABLET ORAL
Qty: 135 TABLET | Refills: 0 | Status: SHIPPED | OUTPATIENT
Start: 2023-02-09 | End: 2023-05-10

## 2023-02-09 NOTE — TELEPHONE ENCOUNTER
Central Prior Authorization Team   Phone: [phone]      PA Initiation    Medication: Simvastatin-PA NOT NEEDED  Insurance Company: [employer] - Phone [phone] Fax [phone]  Pharmacy Filling the Rx: [facility] #[id number] - [place] [address] [address]  Filling Pharmacy Phone: [phone]  Filling Pharmacy Fax:    Start Date: 2023    Started PA on CMM and a response of Clinical Override not needed.  Called insurance, they were able to do a test claim and get a paid claim.  Called and informed pharmacy but they RX they have on file is , please send a new RX to the pharmacy.

## 2023-02-10 RX ORDER — SIMVASTATIN 20 MG
TABLET ORAL
Qty: 135 TABLET | Refills: 0 | OUTPATIENT
Start: 2023-02-10

## 2023-02-10 NOTE — TELEPHONE ENCOUNTER
First Attempt: I sent a my chart message to the patient to please contact our office to schedule a physical with fasting lab work.

## 2023-02-13 DIAGNOSIS — I10 ESSENTIAL HYPERTENSION: ICD-10-CM

## 2023-02-14 RX ORDER — LISINOPRIL 5 MG/1
TABLET ORAL
Qty: 90 TABLET | Refills: 0 | Status: SHIPPED | OUTPATIENT
Start: 2023-02-14 | End: 2023-05-23

## 2023-02-14 NOTE — TELEPHONE ENCOUNTER
"Routing refill request to provider for review/approval because:  Patient needs to be seen because it has been more than 1 year since last office visit.    Last Written Prescription Date:  2/7/22  Last Fill Quantity: 90,  # refills: 3   Last office visit provider:  12/21/21     Requested Prescriptions   Pending Prescriptions Disp Refills     lisinopril (ZESTRIL) 5 MG tablet [Pharmacy Med Name: Lisinopril Oral Tablet 5 MG] 90 tablet 0     Sig: TAKE 1 TABLET (5 MG) BY MOUTH ONCE A DAY       ACE Inhibitors (Including Combos) Protocol Failed - 2/13/2023  7:35 AM        Failed - Blood pressure under 140/90 in past 12 months     BP Readings from Last 3 Encounters:   12/21/21 118/87   11/02/20 121/83   03/13/20 126/73                 Failed - Recent (12 mo) or future (30 days) visit within the authorizing provider's specialty     Patient has had an office visit with the authorizing provider or a provider within the authorizing providers department within the previous 12 mos or has a future within next 30 days. See \"Patient Info\" tab in inbasket, or \"Choose Columns\" in Meds & Orders section of the refill encounter.              Failed - Normal serum creatinine on file in past 12 months     Recent Labs   Lab Test 12/21/21  0940   CR 0.81       Ok to refill medication if creatinine is low          Failed - Normal serum potassium on file in past 12 months     Recent Labs   Lab Test 12/21/21  0940   POTASSIUM 4.5             Passed - Medication is active on med list        Passed - Patient is age 18 or older             Cait Chong RN 02/14/23 10:23 AM  "

## 2023-03-13 ENCOUNTER — OFFICE VISIT (OUTPATIENT)
Dept: FAMILY MEDICINE | Facility: CLINIC | Age: 58
End: 2023-03-13
Payer: COMMERCIAL

## 2023-03-13 VITALS
WEIGHT: 270 LBS | HEART RATE: 62 BPM | HEIGHT: 72 IN | DIASTOLIC BLOOD PRESSURE: 85 MMHG | BODY MASS INDEX: 36.57 KG/M2 | OXYGEN SATURATION: 96 % | SYSTOLIC BLOOD PRESSURE: 143 MMHG

## 2023-03-13 DIAGNOSIS — R73.03 PRE-DIABETES: ICD-10-CM

## 2023-03-13 DIAGNOSIS — E66.09 CLASS 2 OBESITY DUE TO EXCESS CALORIES WITHOUT SERIOUS COMORBIDITY WITH BODY MASS INDEX (BMI) OF 37.0 TO 37.9 IN ADULT: ICD-10-CM

## 2023-03-13 DIAGNOSIS — E66.812 CLASS 2 OBESITY DUE TO EXCESS CALORIES WITHOUT SERIOUS COMORBIDITY WITH BODY MASS INDEX (BMI) OF 37.0 TO 37.9 IN ADULT: ICD-10-CM

## 2023-03-13 DIAGNOSIS — J45.30 MILD PERSISTENT ASTHMA WITHOUT COMPLICATION: ICD-10-CM

## 2023-03-13 DIAGNOSIS — D12.6 COLON ADENOMAS: ICD-10-CM

## 2023-03-13 DIAGNOSIS — E78.5 HYPERLIPIDEMIA, UNSPECIFIED HYPERLIPIDEMIA TYPE: Chronic | ICD-10-CM

## 2023-03-13 DIAGNOSIS — Z00.00 HEALTHCARE MAINTENANCE: Primary | ICD-10-CM

## 2023-03-13 DIAGNOSIS — Z12.5 SCREENING PSA (PROSTATE SPECIFIC ANTIGEN): ICD-10-CM

## 2023-03-13 DIAGNOSIS — I10 ESSENTIAL HYPERTENSION: ICD-10-CM

## 2023-03-13 LAB
ALBUMIN SERPL BCG-MCNC: 4.4 G/DL (ref 3.5–5.2)
ALP SERPL-CCNC: 63 U/L (ref 40–129)
ALT SERPL W P-5'-P-CCNC: 69 U/L (ref 10–50)
ANION GAP SERPL CALCULATED.3IONS-SCNC: 11 MMOL/L (ref 7–15)
AST SERPL W P-5'-P-CCNC: 44 U/L (ref 10–50)
BILIRUB SERPL-MCNC: 0.6 MG/DL
BUN SERPL-MCNC: 18.6 MG/DL (ref 6–20)
CALCIUM SERPL-MCNC: 9.6 MG/DL (ref 8.6–10)
CHLORIDE SERPL-SCNC: 102 MMOL/L (ref 98–107)
CHOLEST SERPL-MCNC: 208 MG/DL
CREAT SERPL-MCNC: 0.88 MG/DL (ref 0.67–1.17)
DEPRECATED HCO3 PLAS-SCNC: 26 MMOL/L (ref 22–29)
ERYTHROCYTE [DISTWIDTH] IN BLOOD BY AUTOMATED COUNT: 12.9 % (ref 10–15)
GFR SERPL CREATININE-BSD FRML MDRD: >90 ML/MIN/1.73M2
GLUCOSE SERPL-MCNC: 119 MG/DL (ref 70–99)
HBA1C MFR BLD: 5.2 % (ref 0–5.6)
HCT VFR BLD AUTO: 46.3 % (ref 40–53)
HDLC SERPL-MCNC: 48 MG/DL
HGB BLD-MCNC: 15.4 G/DL (ref 13.3–17.7)
LDLC SERPL CALC-MCNC: 132 MG/DL
MCH RBC QN AUTO: 34.1 PG (ref 26.5–33)
MCHC RBC AUTO-ENTMCNC: 33.3 G/DL (ref 31.5–36.5)
MCV RBC AUTO: 103 FL (ref 78–100)
NONHDLC SERPL-MCNC: 160 MG/DL
PLATELET # BLD AUTO: 189 10E3/UL (ref 150–450)
POTASSIUM SERPL-SCNC: 4.4 MMOL/L (ref 3.4–5.3)
PROT SERPL-MCNC: 7.7 G/DL (ref 6.4–8.3)
PSA SERPL DL<=0.01 NG/ML-MCNC: 0.76 NG/ML (ref 0–3.5)
RBC # BLD AUTO: 4.51 10E6/UL (ref 4.4–5.9)
SODIUM SERPL-SCNC: 139 MMOL/L (ref 136–145)
TRIGL SERPL-MCNC: 141 MG/DL
WBC # BLD AUTO: 5.8 10E3/UL (ref 4–11)

## 2023-03-13 PROCEDURE — 80053 COMPREHEN METABOLIC PANEL: CPT | Performed by: FAMILY MEDICINE

## 2023-03-13 PROCEDURE — 36415 COLL VENOUS BLD VENIPUNCTURE: CPT | Performed by: FAMILY MEDICINE

## 2023-03-13 PROCEDURE — 85027 COMPLETE CBC AUTOMATED: CPT | Performed by: FAMILY MEDICINE

## 2023-03-13 PROCEDURE — 99214 OFFICE O/P EST MOD 30 MIN: CPT | Mod: 25 | Performed by: FAMILY MEDICINE

## 2023-03-13 PROCEDURE — 99396 PREV VISIT EST AGE 40-64: CPT | Performed by: FAMILY MEDICINE

## 2023-03-13 PROCEDURE — 83036 HEMOGLOBIN GLYCOSYLATED A1C: CPT | Performed by: FAMILY MEDICINE

## 2023-03-13 PROCEDURE — 80061 LIPID PANEL: CPT | Performed by: FAMILY MEDICINE

## 2023-03-13 PROCEDURE — G0103 PSA SCREENING: HCPCS | Performed by: FAMILY MEDICINE

## 2023-03-13 RX ORDER — FLUTICASONE PROPIONATE 110 UG/1
1 AEROSOL, METERED RESPIRATORY (INHALATION) DAILY
Qty: 12 G | Refills: 3
Start: 2023-03-13 | End: 2023-04-12

## 2023-03-13 RX ORDER — CALCIUM CARBONATE 500(1250)
TABLET,CHEWABLE ORAL
COMMUNITY
Start: 2022-09-15

## 2023-03-13 ASSESSMENT — ENCOUNTER SYMPTOMS
HEMATOCHEZIA: 0
SORE THROAT: 0
ARTHRALGIAS: 0
JOINT SWELLING: 0
ABDOMINAL PAIN: 0
DIZZINESS: 0
FEVER: 0
NERVOUS/ANXIOUS: 0
PALPITATIONS: 0
SHORTNESS OF BREATH: 0
COUGH: 1
DYSURIA: 0
FREQUENCY: 0
WEAKNESS: 0
HEADACHES: 0
PARESTHESIAS: 0
MYALGIAS: 0
CHILLS: 0
HEMATURIA: 0
HEARTBURN: 0
NAUSEA: 0
DIARRHEA: 0
CONSTIPATION: 0
EYE PAIN: 0

## 2023-03-13 ASSESSMENT — ASTHMA QUESTIONNAIRES
QUESTION_5 LAST FOUR WEEKS HOW WOULD YOU RATE YOUR ASTHMA CONTROL: COMPLETELY CONTROLLED
QUESTION_3 LAST FOUR WEEKS HOW OFTEN DID YOUR ASTHMA SYMPTOMS (WHEEZING, COUGHING, SHORTNESS OF BREATH, CHEST TIGHTNESS OR PAIN) WAKE YOU UP AT NIGHT OR EARLIER THAN USUAL IN THE MORNING: NOT AT ALL
ACT_TOTALSCORE: 25
QUESTION_1 LAST FOUR WEEKS HOW MUCH OF THE TIME DID YOUR ASTHMA KEEP YOU FROM GETTING AS MUCH DONE AT WORK, SCHOOL OR AT HOME: NONE OF THE TIME
QUESTION_2 LAST FOUR WEEKS HOW OFTEN HAVE YOU HAD SHORTNESS OF BREATH: NOT AT ALL
QUESTION_4 LAST FOUR WEEKS HOW OFTEN HAVE YOU USED YOUR RESCUE INHALER OR NEBULIZER MEDICATION (SUCH AS ALBUTEROL): NOT AT ALL
ACT_TOTALSCORE: 25

## 2023-03-13 NOTE — PROGRESS NOTES
SUBJECTIVE:   CC: Ulices is an 57 year old who presents for preventative health visit.     Patient has been advised of split billing requirements and indicates understanding: Yes  Healthy Habits:     Getting at least 3 servings of Calcium per day:  Yes    Bi-annual eye exam:  Yes    Dental care twice a year:  NO    Sleep apnea or symptoms of sleep apnea:  None    Diet:  Carbohydrate counting    Frequency of exercise:  2-3 days/week    PHQ-2 Total Score: 0    ACT Total Scores 11/2/2020 12/21/2021 3/13/2023   ACT TOTAL SCORE (Goal Greater than or Equal to 20) 24 23 25   In the past 12 months, how many times did you visit the emergency room for your asthma without being admitted to the hospital? 0 0 0   In the past 12 months, how many times were you hospitalized overnight because of your asthma? 0 0 0             Today's PHQ-2 Score:   PHQ-2 ( 1999 Pfizer) 3/13/2023   Q1: Little interest or pleasure in doing things 0   Q2: Feeling down, depressed or hopeless 0   PHQ-2 Score 0   Q1: Little interest or pleasure in doing things Not at all   Q2: Feeling down, depressed or hopeless Not at all   PHQ-2 Score 0           Social History     Tobacco Use     Smoking status: Never     Smokeless tobacco: Never   Substance Use Topics     Alcohol use: Not on file     NEVER SMOKER/ REMOTE USE OF CHEW  ALCOHOL: 7-10 DRINKS PER WEEK    Alcohol Use 3/13/2023   Prescreen: >3 drinks/day or >7 drinks/week? Yes   AUDIT SCORE  4     AUDIT - Alcohol Use Disorders Identification Test - Reproduced from the World Health Organization Audit 2001 (Second Edition) 3/13/2023   1.  How often do you have a drink containing alcohol? 2 to 3 times a week   2.  How many drinks containing alcohol do you have on a typical day when you are drinking? 3 or 4   3.  How often do you have five or more drinks on one occasion? Never   4.  How often during the last year have you found that you were not able to stop drinking once you had started? Never   5.  How often  during the last year have you failed to do what was normally expected of you because of drinking? Never   6.  How often during the last year have you needed a first drink in the morning to get yourself going after a heavy drinking session? Never   7.  How often during the last year have you had a feeling of guilt or remorse after drinking? Never   8.  How often during the last year have you been unable to remember what happened the night before because of your drinking? Never   9.  Have you or someone else been injured because of your drinking? No   10. Has a relative, friend, doctor or other health care worker been concerned about your drinking or suggested you cut down? No   TOTAL SCORE 4       Last PSA:   Prostate Specific Antigen Screen   Date Value Ref Range Status   12/21/2021 0.79 0.00 - 3.50 ug/L Final       Reviewed orders with patient. Reviewed health maintenance and updated orders accordingly -       Reviewed and updated as needed this visit by clinical staff   Tobacco  Allergies  Meds              Reviewed and updated as needed this visit by Provider                     Review of Systems   Constitutional: Negative for chills and fever.   HENT: Negative for congestion, ear pain, hearing loss and sore throat.    Eyes: Negative for pain and visual disturbance.   Respiratory: Positive for cough. Negative for shortness of breath.    Cardiovascular: Negative for chest pain, palpitations and peripheral edema.   Gastrointestinal: Negative for abdominal pain, constipation, diarrhea, heartburn, hematochezia and nausea.   Genitourinary: Negative for dysuria, frequency, genital sores, hematuria, impotence, penile discharge and urgency.   Musculoskeletal: Negative for arthralgias, joint swelling and myalgias.   Skin: Negative for rash.   Neurological: Negative for dizziness, weakness, headaches and paresthesias.   Psychiatric/Behavioral: Negative for mood changes. The patient is not nervous/anxious.   "        OBJECTIVE:   BP (!) 143/85   Pulse 62   Ht 1.816 m (5' 11.5\")   Wt 122.5 kg (270 lb)   SpO2 96%   BMI 37.13 kg/m       Wt Readings from Last 4 Encounters:   03/13/23 122.5 kg (270 lb)   12/21/21 114.3 kg (252 lb)   11/02/20 109.4 kg (241 lb 3.2 oz)   02/26/20 116.9 kg (257 lb 12.8 oz)       Physical Exam  GENERAL: healthy, alert and no distress  EYES: Eyes grossly normal to inspection, PERRL and conjunctivae and sclerae normal  HENT: ear canals and TM's normal, nose and mouth without ulcers or lesions  NECK: no adenopathy, no asymmetry, masses, or scars and thyroid normal to palpation  RESP: lungs clear to auscultation - no rales, rhonchi or wheezes  CV: regular rate and rhythm, normal S1 S2, no S3 or S4, no murmur, click or rub, no peripheral edema and peripheral pulses strong  ABDOMEN: soft, nontender, no hepatosplenomegaly, no masses and bowel sounds normal  RECTAL:  Exam declined.  Will do yearly PSA  MS: no gross musculoskeletal defects noted, no edema  SKIN: no suspicious lesions or rashes  NEURO: Normal strength and tone, mentation intact and speech normal  PSYCH: mentation appears normal, affect normal/bright    Prostate Specific Antigen Screen   Date Value Ref Range Status   12/21/2021 0.79 0.00 - 3.50 ug/L Final     Lab Results   Component Value Date    A1C 5.1 11/02/2020    A1C 5.7 11/22/2019     Last Comprehensive Metabolic Panel:  Sodium   Date Value Ref Range Status   12/21/2021 138 136 - 145 mmol/L Final     Potassium   Date Value Ref Range Status   12/21/2021 4.5 3.5 - 5.0 mmol/L Final     Chloride   Date Value Ref Range Status   12/21/2021 101 98 - 107 mmol/L Final     Carbon Dioxide (CO2)   Date Value Ref Range Status   12/21/2021 24 22 - 31 mmol/L Final     Anion Gap   Date Value Ref Range Status   12/21/2021 13 5 - 18 mmol/L Final     Glucose   Date Value Ref Range Status   12/21/2021 115 70 - 125 mg/dL Final     Urea Nitrogen   Date Value Ref Range Status   12/21/2021 17 8 - 22 " mg/dL Final     Creatinine   Date Value Ref Range Status   12/21/2021 0.81 0.70 - 1.30 mg/dL Final     GFR Estimate   Date Value Ref Range Status   12/21/2021 >90 >60 mL/min/1.73m2 Final     Comment:     Effective December 21, 2021 eGFRcr in adults is calculated using the 2021 CKD-EPI creatinine equation which includes age and gender (Tati et al., [facility], DOI: 10.1056/JLHGct3158146)   11/02/2020 >60 >60 mL/min/1.73m2 Final     Calcium   Date Value Ref Range Status   12/21/2021 10.4 8.5 - 10.5 mg/dL Final     Bilirubin Total   Date Value Ref Range Status   12/21/2021 0.7 0.0 - 1.0 mg/dL Final     Alkaline Phosphatase   Date Value Ref Range Status   12/21/2021 60 45 - 120 U/L Final     ALT   Date Value Ref Range Status   12/21/2021 49 (H) 0 - 45 U/L Final     AST   Date Value Ref Range Status   12/21/2021 31 0 - 40 U/L Final               ASSESSMENT/PLAN:       ICD-10-CM    1. Healthcare maintenance  Z00.00 REVIEW OF HEALTH MAINTENANCE PROTOCOL ORDERS     CBC with platelets      2. Mild persistent asthma without complication, stablre  J45.30 fluticasone (FLOVENT HFA) 110 MCG/ACT inhaler      3. Colon adenomas, get colonoscopy in the near future D12.6       4. Hyperlipidemia, unspecified hyperlipidemia type, stable on sdtastin E78.5 Lipid Profile (Chol, Trig, HDL, LDL calc)     Comprehensive metabolic panel (BMP + Alb, Alk Phos, ALT, AST, Total. Bili, TP)      5. Essential hypertension, controlled at home I10       6. Screening PSA (prostate specific antigen)  Z12.5 PSA, screen      7. Class 2 obesity due to excess calories without serious comorbidity with body mass index (BMI) of 37.0 to 37.9 in adult  E66.09     Z68.37       8. Pre-diabetes  R73.03 Hemoglobin A1c          PLAN:   Repeat your colonoscopy in the near future due to poor prep on Sept 2022 colonoscopy    Fasting labs    The following high BMI interventions were performed this visit: encouragement to exercise, weight monitoring, and prescribed dietary  intake      Patient has been advised of split billing requirements and indicates understanding: Yes      COUNSELING:   Reviewed preventive health counseling, as reflected in patient instructions       Regular exercise       Healthy diet/nutrition       Colorectal cancer screening       Prostate cancer screening        He reports that he has never smoked. He has never used smokeless tobacco.        Price Wolfe MD  [facility]

## 2023-03-13 NOTE — PATIENT INSTRUCTIONS
Repeat your colonoscopy in the near future due to poor prep on Sept 2022 colonoscopy    Fasting labs    CONTINUE TO FOLLOW  HOME Bps      The following high BMI interventions were performed this visit: encouragement to exercise, weight monitoring, and prescribed dietary intake

## 2023-03-13 NOTE — LETTER
March 13, 2023      Ulices Gilbert  [address] [facility] [place] [address]        Dear ,  We are writing to inform you of your test results.    Elmer Elena:  Your PSA is normal and remains stable.  Recheck the PSA in one year.  The cholesterol panel is good with LDL near goal of below 130.  The fasting blood sugar is in the pre-diabetic range but the A1C is normal.  Work on the weight loss we discussed.  This will help the mildly elevated liver enzyme (ALT) return to normal.  It will also help prevent the development of diabetes.  The remaining labs are normal.    Resulted Orders   Lipid Profile (Chol, Trig, HDL, LDL calc)   Result Value Ref Range    Cholesterol 208 (H) <200 mg/dL    Triglycerides 141 <150 mg/dL    Direct Measure HDL 48 >=40 mg/dL    LDL Cholesterol Calculated 132 (H) <=100 mg/dL    Non HDL Cholesterol 160 (H) <130 mg/dL    Narrative    Cholesterol  Desirable:  <200 mg/dL    Triglycerides  Normal:  Less than 150 mg/dL  Borderline High:  150-199 mg/dL  High:  200-499 mg/dL  Very High:  Greater than or equal to 500 mg/dL    Direct Measure HDL  Female:  Greater than or equal to 50 mg/dL   Male:  Greater than or equal to 40 mg/dL    LDL Cholesterol  Desirable:  <100mg/dL  Above Desirable:  100-129 mg/dL   Borderline High:  130-159 mg/dL   High:  160-189 mg/dL   Very High:  >= 190 mg/dL    Non HDL Cholesterol  Desirable:  130 mg/dL  Above Desirable:  130-159 mg/dL  Borderline High:  160-189 mg/dL  High:  190-219 mg/dL  Very High:  Greater than or equal to 220 mg/dL   Comprehensive metabolic panel (BMP + Alb, Alk Phos, ALT, AST, Total. Bili, TP)   Result Value Ref Range    Sodium 139 136 - 145 mmol/L    Potassium 4.4 3.4 - 5.3 mmol/L    Chloride 102 98 - 107 mmol/L    Carbon Dioxide (CO2) 26 22 - 29 mmol/L    Anion Gap 11 7 - 15 mmol/L    Urea Nitrogen 18.6 6.0 - 20.0 mg/dL    Creatinine 0.88 0.67 - 1.17 mg/dL    Calcium 9.6 8.6 - 10.0 mg/dL    Glucose 119 (H) 70 - 99 mg/dL    Alkaline Phosphatase  63 40 - 129 U/L    AST 44 10 - 50 U/L    ALT 69 (H) 10 - 50 U/L    Protein Total 7.7 6.4 - 8.3 g/dL    Albumin 4.4 3.5 - 5.2 g/dL    Bilirubin Total 0.6 <=1.2 mg/dL    GFR Estimate >90 >60 mL/min/1.73m2      Comment:      eGFR calculated using 2021 CKD-EPI equation.   CBC with platelets   Result Value Ref Range    WBC Count 5.8 4.0 - 11.0 10e3/uL    RBC Count 4.51 4.40 - 5.90 10e6/uL    Hemoglobin 15.4 13.3 - 17.7 g/dL    Hematocrit 46.3 40.0 - 53.0 %     (H) 78 - 100 fL    MCH 34.1 (H) 26.5 - 33.0 pg    MCHC 33.3 31.5 - 36.5 g/dL    RDW 12.9 10.0 - 15.0 %    Platelet Count 189 150 - 450 10e3/uL   PSA, screen   Result Value Ref Range    Prostate Specific Antigen Screen 0.76 0.00 - 3.50 ng/mL    Narrative    This result is obtained using the Roche Elecsys total PSA method on the angel luis e801 immunoassay analyzer. Results obtained with different assay methods or kits cannot be used interchangeably.   Hemoglobin A1c   Result Value Ref Range    Hemoglobin A1C 5.2 0.0 - 5.6 %      Comment:      Normal <5.7%   Prediabetes 5.7-6.4%    Diabetes 6.5% or higher     Note: Adopted from ADA consensus guidelines.       If you have any questions or concerns, please call the clinic at the number listed above.       Sincerely,      Price Wolfe MD

## 2023-04-10 DIAGNOSIS — J45.30 MILD PERSISTENT ASTHMA WITHOUT COMPLICATION: ICD-10-CM

## 2023-04-10 RX ORDER — DEXAMETHASONE 4 MG/1
TABLET ORAL
Qty: 12 G | Refills: 0 | OUTPATIENT
Start: 2023-04-10

## 2023-04-12 DIAGNOSIS — J45.30 MILD PERSISTENT ASTHMA WITHOUT COMPLICATION: ICD-10-CM

## 2023-04-12 RX ORDER — FLUTICASONE PROPIONATE 110 UG/1
1 AEROSOL, METERED RESPIRATORY (INHALATION) DAILY
Qty: 12 G | Refills: 3 | Status: SHIPPED | OUTPATIENT
Start: 2023-04-12 | End: 2024-01-16

## 2023-05-09 DIAGNOSIS — E78.5 HYPERLIPIDEMIA LDL GOAL <130: ICD-10-CM

## 2023-05-10 ENCOUNTER — TELEPHONE (OUTPATIENT)
Dept: FAMILY MEDICINE | Facility: CLINIC | Age: 58
End: 2023-05-10
Payer: COMMERCIAL

## 2023-05-10 RX ORDER — SIMVASTATIN 20 MG
TABLET ORAL
Qty: 135 TABLET | Refills: 3 | Status: SHIPPED | OUTPATIENT
Start: 2023-05-10 | End: 2024-01-30

## 2023-05-10 NOTE — TELEPHONE ENCOUNTER
"Last Written Prescription Date:  2/9/2023  Last Fill Quantity: 135,  # refills: 0   Last office visit provider:  3/13/2023     Requested Prescriptions   Pending Prescriptions Disp Refills     simvastatin (ZOCOR) 20 MG tablet [Pharmacy Med Name: Simvastatin Oral Tablet 20 MG] 135 tablet 0     Sig: TAKE 1&1/2 TABLETS (30 MG) BY MOUTH ONCE DAILY       Statins Protocol Passed - 5/10/2023  9:48 AM        Passed - LDL on file in past 12 months     Recent Labs   Lab Test 03/13/23  0743   *             Passed - No abnormal creatine kinase in past 12 months     No lab results found.             Passed - Recent (12 mo) or future (30 days) visit within the authorizing provider's specialty     Patient has had an office visit with the authorizing provider or a provider within the authorizing providers department within the previous 12 mos or has a future within next 30 days. See \"Patient Info\" tab in inbasket, or \"Choose Columns\" in Meds & Orders section of the refill encounter.              Passed - Medication is active on med list        Passed - Patient is age 18 or older             Shasta Gomez RN 05/10/23 9:48 AM  "

## 2023-05-10 NOTE — TELEPHONE ENCOUNTER
Received fax from pharmacy requesting PA on simvastatin because of quantity limit applies.    Please start PA on simvastatin.

## 2023-05-19 NOTE — TELEPHONE ENCOUNTER
Received 2nd request for PA.  Reviewed previous request documentation and request from Feb.    Contacted pharmacy discussed issue.  Patient was able to receive full rx in Feb, but with submission now, it is not covered.  She tried to resubmit it again and still coming up with a quantity limit.  They will cover 102 tablets.      Please submit PA.  Thank you

## 2023-05-22 DIAGNOSIS — I10 ESSENTIAL HYPERTENSION: ICD-10-CM

## 2023-05-23 RX ORDER — LISINOPRIL 5 MG/1
TABLET ORAL
Qty: 90 TABLET | Refills: 0 | Status: SHIPPED | OUTPATIENT
Start: 2023-05-23 | End: 2023-08-07

## 2023-05-23 NOTE — TELEPHONE ENCOUNTER
"Routing refill request to provider for review/approval because:  Labs out of range:  BP    Last Written Prescription Date:  2/14/2023  Last Fill Quantity: 90,  # refills: 0   Last office visit provider:  3/13/2023     Requested Prescriptions   Pending Prescriptions Disp Refills     lisinopril (ZESTRIL) 5 MG tablet [Pharmacy Med Name: Lisinopril Oral Tablet 5 MG] 90 tablet 0     Sig: TAKE 1 TABLET (5 MG) BY MOUTH ONCE A DAY       ACE Inhibitors (Including Combos) Protocol Failed - 5/22/2023  9:10 AM        Failed - Blood pressure under 140/90 in past 12 months     BP Readings from Last 3 Encounters:   03/13/23 (!) 143/85   12/21/21 118/87   11/02/20 121/83                 Passed - Recent (12 mo) or future (30 days) visit within the authorizing provider's specialty     Patient has had an office visit with the authorizing provider or a provider within the authorizing providers department within the previous 12 mos or has a future within next 30 days. See \"Patient Info\" tab in inbasket, or \"Choose Columns\" in Meds & Orders section of the refill encounter.              Passed - Medication is active on med list        Passed - Patient is age 18 or older        Passed - Normal serum creatinine on file in past 12 months     Recent Labs   Lab Test 03/13/23  0743   CR 0.88       Ok to refill medication if creatinine is low          Passed - Normal serum potassium on file in past 12 months     Recent Labs   Lab Test 03/13/23  0743   POTASSIUM 4.4                  Alice Colon RN 05/22/23 11:32 PM  "

## 2023-06-12 ENCOUNTER — TELEPHONE (OUTPATIENT)
Dept: FAMILY MEDICINE | Facility: CLINIC | Age: 58
End: 2023-06-12
Payer: COMMERCIAL

## 2023-06-12 ENCOUNTER — MYC MEDICAL ADVICE (OUTPATIENT)
Dept: FAMILY MEDICINE | Facility: CLINIC | Age: 58
End: 2023-06-12
Payer: COMMERCIAL

## 2023-06-12 NOTE — TELEPHONE ENCOUNTER
Patient Quality Outreach    Patient is due for the following:   Hypertension -  BP check    Next Steps:   Schedule a nurse only visit for Bp check    Type of outreach:    Sent [facility] message.    Next Steps:  Reach out within 90 days via [facility].    Max number of attempts reached: No. Will try again in 90 days if patient still on fail list.    Questions for provider review:    None           Toiñto Ahmadi RN

## 2023-06-16 NOTE — TELEPHONE ENCOUNTER
Patient responded with BP reading in range.    Toñito Ahmadi RN     [facility]

## 2023-07-17 NOTE — TELEPHONE ENCOUNTER
Patient's dad is calling back requesting to speak to clinical     Caller returning call to Clinical Team- Connected to CMA- Oanh- CONNECT CALL TO CALL CENTER CMA QUEUE- ROUTE MESSAGE TO CALL CENTER CMA POOL ([phone]) .        RN Triage:    Wife calling.  Widespread hives after weed whacking this afternoon.  He is in shower, so wife is unsure about where hives are and the severity.  No breathing or swallowing difficulty.  Has happened before and he was put on steroids.  Home care and precautions discussed.  She plans to monitor at home for now.  Will call back if symptoms persist or worsen.  Will take him to ER if any breathing or swallowing difficulty should arise.    Akosua Campbell RN   [facility]    Reason for Disposition    Widespread hives    Protocols used: HIVES-A-

## 2023-08-06 DIAGNOSIS — I10 ESSENTIAL HYPERTENSION: ICD-10-CM

## 2023-08-07 RX ORDER — LISINOPRIL 5 MG/1
TABLET ORAL
Qty: 90 TABLET | Refills: 1 | Status: SHIPPED | OUTPATIENT
Start: 2023-08-07 | End: 2024-01-30

## 2023-08-07 NOTE — TELEPHONE ENCOUNTER
"Routing refill request to provider for review/approval because:  Labs out of range:  BP  Last Written Prescription Date:  5/23/2023  Last Fill Quantity: 90,  # refills: 0   Last office visit provider:  3/13/2023 with PCP Dr CHEVY Wolfe      Requested Prescriptions   Pending Prescriptions Disp Refills    lisinopril (ZESTRIL) 5 MG tablet [Pharmacy Med Name: Lisinopril Oral Tablet 5 MG] 90 tablet 0     Sig: TAKE 1 TABLET (5 MG) BY MOUTH ONCE A DAY       ACE Inhibitors (Including Combos) Protocol Failed - 8/6/2023  1:26 PM        Failed - Blood pressure under 140/90 in past 12 months     BP Readings from Last 3 Encounters:   03/13/23 (!) 143/85   12/21/21 118/87   11/02/20 121/83                 Passed - Recent (12 mo) or future (30 days) visit within the authorizing provider's specialty     Patient has had an office visit with the authorizing provider or a provider within the authorizing providers department within the previous 12 mos or has a future within next 30 days. See \"Patient Info\" tab in inbasket, or \"Choose Columns\" in Meds & Orders section of the refill encounter.              Passed - Medication is active on med list        Passed - Patient is age 18 or older        Passed - Normal serum creatinine on file in past 12 months     Recent Labs   Lab Test 03/13/23  0743   CR 0.88       Ok to refill medication if creatinine is low          Passed - Normal serum potassium on file in past 12 months     Recent Labs   Lab Test 03/13/23  0743   POTASSIUM 4.4                  Charisma lA RN 08/06/23 10:24 PM  "

## 2023-11-16 ENCOUNTER — PATIENT OUTREACH (OUTPATIENT)
Dept: GASTROENTEROLOGY | Facility: CLINIC | Age: 58
End: 2023-11-16
Payer: COMMERCIAL

## 2023-11-16 DIAGNOSIS — Z12.11 SPECIAL SCREENING FOR MALIGNANT NEOPLASMS, COLON: Primary | ICD-10-CM

## 2023-11-16 NOTE — PROGRESS NOTES
"Hx adenomatous polyps. Previous colon prep was poor in 2022. Recommend repeat at next available.  CRC Screening Colonoscopy Referral Review    Patient meets the inclusion criteria for screening colonoscopy standing order.    Ordering/Referring Provider:  Price Wolfe    BMI: Estimated body mass index is 37.13 kg/m  as calculated from the following:    Height as of 3/13/23: 1.816 m (5' 11.5\").    Weight as of 3/13/23: 122.5 kg (270 lb).     Sedation:  Does patient have any of the following conditions affecting sedation?  No medical conditions affecting sedation.    Previous Scopes:  Any previous recommendations or follow up needs based on previous scope?  Prep recommendations: recommend double prep for future colonoscopies     Medical Concerns to Postpone Order:  Does patient have any of the following medical concerns that should postpone/delay colonoscopy referral?  No medical conditions affecting colonoscopy referral.    Final Referral Details:  Based on patient's medical history patient is appropriate for referral order with moderate sedation. If patient's BMI > 50 do not schedule in ASC.  "

## 2023-11-16 NOTE — LETTER
11/16/2023         RE: Ulices Gilbert  [address] [facility] [id number]            Forrest Elena,    We hope this letter finds you doing well.     Your health is important to us at [facility]. Our records indicate that you could be due, or possibly overdue, for a screening or surveillance colonoscopy if you have not had a colonoscopy since May 2023. Previous colon prep was inadequate.    An order has been placed for you to have this completed. Our scheduling team will be reaching out to you to assist in getting this scheduled. If you do not hear from them within 7 days or you would like to schedule sooner, please call [phone], option 2 for endoscopy scheduling.     If you believe this is in error and have already had a colonoscopy done, please have your results and recommendations faxed to [phone].    If you have questions about this order or have further concerns, please reach out to your primary care provider.     Rogelio     Colorectal Cancer RN Screening Team  [facility]

## 2023-12-15 ENCOUNTER — TRANSFERRED RECORDS (OUTPATIENT)
Dept: HEALTH INFORMATION MANAGEMENT | Facility: CLINIC | Age: 58
End: 2023-12-15
Payer: COMMERCIAL

## 2024-01-02 ENCOUNTER — TELEPHONE (OUTPATIENT)
Dept: FAMILY MEDICINE | Facility: CLINIC | Age: 59
End: 2024-01-02
Payer: COMMERCIAL

## 2024-01-02 NOTE — TELEPHONE ENCOUNTER
Left detailed message stating. PCP is rehiring in a few weeks but we do have a few provider here that are accepting new patient.   Please assist with scheduling with another provider in clinic.

## 2024-01-02 NOTE — TELEPHONE ENCOUNTER
Reason for Call:  Appointment Request    Patient requesting this type of appt:  Preventive; med refill    Requested provider: Price Wolfe    Reason patient unable to be scheduled: Not within requested timeframe    When does patient want to be seen/preferred time:  need to schedule with clinic    Comments: Pt need an appt for physical and med refill in March, Dr. Wolfe schedule was not avail.    Could we send this information to you in [employer] or would you prefer to receive a phone call?:   Patient would prefer a phone call   Okay to leave a detailed message?: Yes at Cell number on file:    Telephone Information:   Mobile [phone]       Call taken on 1/2/2024 at 1:26 PM by Roxie Vallecillo

## 2024-01-12 ENCOUNTER — TELEPHONE (OUTPATIENT)
Dept: FAMILY MEDICINE | Facility: CLINIC | Age: 59
End: 2024-01-12
Payer: COMMERCIAL

## 2024-01-12 NOTE — TELEPHONE ENCOUNTER
Please send in Prior authorization for his Flovent 110MCG/ACT Inhaler.  Patient called in for this and had no further questions.

## 2024-01-15 NOTE — TELEPHONE ENCOUNTER
Incoming call from pharmacy: Per insurance neither the generic or name brand is not covered. Please send a prescription for a different inhaler

## 2024-01-16 DIAGNOSIS — J45.30 MILD PERSISTENT ASTHMA WITHOUT COMPLICATION: Primary | ICD-10-CM

## 2024-01-16 NOTE — TELEPHONE ENCOUNTER
Incoming call from pharmacy: Per insurance neither the generic or name brand is not covered. Please send a prescription for a different inhaler         Please see note above from yesterday. This was not addressed and he needs a new inhaler. Patient called today to see what had been done.   Please send new prescription.

## 2024-01-16 NOTE — TELEPHONE ENCOUNTER
Rx sent for Asmanex  mcg inhaled twice daily.  If doing well on this dose then could decrease to once daily.  Discussed with patient.

## 2024-01-28 ASSESSMENT — ASTHMA QUESTIONNAIRES
QUESTION_3 LAST FOUR WEEKS HOW OFTEN DID YOUR ASTHMA SYMPTOMS (WHEEZING, COUGHING, SHORTNESS OF BREATH, CHEST TIGHTNESS OR PAIN) WAKE YOU UP AT NIGHT OR EARLIER THAN USUAL IN THE MORNING: NOT AT ALL
ACT_TOTALSCORE: 25
QUESTION_4 LAST FOUR WEEKS HOW OFTEN HAVE YOU USED YOUR RESCUE INHALER OR NEBULIZER MEDICATION (SUCH AS ALBUTEROL): NOT AT ALL
QUESTION_1 LAST FOUR WEEKS HOW MUCH OF THE TIME DID YOUR ASTHMA KEEP YOU FROM GETTING AS MUCH DONE AT WORK, SCHOOL OR AT HOME: NONE OF THE TIME
ACT_TOTALSCORE: 25
QUESTION_2 LAST FOUR WEEKS HOW OFTEN HAVE YOU HAD SHORTNESS OF BREATH: NOT AT ALL
QUESTION_5 LAST FOUR WEEKS HOW WOULD YOU RATE YOUR ASTHMA CONTROL: COMPLETELY CONTROLLED

## 2024-01-28 ASSESSMENT — ENCOUNTER SYMPTOMS
HEMATOCHEZIA: 0
MYALGIAS: 0
CHILLS: 0
FREQUENCY: 0
DIARRHEA: 0
HEMATURIA: 0
ABDOMINAL PAIN: 0
COUGH: 0
DYSURIA: 0
DIZZINESS: 0
JOINT SWELLING: 0
CONSTIPATION: 0
HEARTBURN: 0
PALPITATIONS: 0
SORE THROAT: 0
NAUSEA: 0
NERVOUS/ANXIOUS: 0
EYE PAIN: 0
PARESTHESIAS: 0
ARTHRALGIAS: 0
SHORTNESS OF BREATH: 0
HEADACHES: 0
FEVER: 0
WEAKNESS: 0

## 2024-01-30 ENCOUNTER — TELEPHONE (OUTPATIENT)
Dept: FAMILY MEDICINE | Facility: CLINIC | Age: 59
End: 2024-01-30

## 2024-01-30 ENCOUNTER — OFFICE VISIT (OUTPATIENT)
Dept: FAMILY MEDICINE | Facility: CLINIC | Age: 59
End: 2024-01-30
Payer: COMMERCIAL

## 2024-01-30 VITALS
RESPIRATION RATE: 12 BRPM | OXYGEN SATURATION: 96 % | SYSTOLIC BLOOD PRESSURE: 133 MMHG | TEMPERATURE: 98.1 F | HEART RATE: 63 BPM | BODY MASS INDEX: 34.86 KG/M2 | WEIGHT: 257.4 LBS | DIASTOLIC BLOOD PRESSURE: 74 MMHG | HEIGHT: 72 IN

## 2024-01-30 DIAGNOSIS — T63.441D BEE STING REACTION, ACCIDENTAL OR UNINTENTIONAL, SUBSEQUENT ENCOUNTER: ICD-10-CM

## 2024-01-30 DIAGNOSIS — E66.812 CLASS 2 SEVERE OBESITY DUE TO EXCESS CALORIES WITH SERIOUS COMORBIDITY IN ADULT, UNSPECIFIED BMI (H): ICD-10-CM

## 2024-01-30 DIAGNOSIS — E66.01 CLASS 2 SEVERE OBESITY DUE TO EXCESS CALORIES WITH SERIOUS COMORBIDITY IN ADULT, UNSPECIFIED BMI (H): ICD-10-CM

## 2024-01-30 DIAGNOSIS — I10 ESSENTIAL HYPERTENSION: ICD-10-CM

## 2024-01-30 DIAGNOSIS — Z00.00 ROUTINE GENERAL MEDICAL EXAMINATION AT A HEALTH CARE FACILITY: Primary | ICD-10-CM

## 2024-01-30 DIAGNOSIS — E55.9 VITAMIN D DEFICIENCY: ICD-10-CM

## 2024-01-30 DIAGNOSIS — D12.6 COLON ADENOMAS: ICD-10-CM

## 2024-01-30 DIAGNOSIS — E78.5 HYPERLIPIDEMIA LDL GOAL <130: ICD-10-CM

## 2024-01-30 DIAGNOSIS — J45.30 MILD PERSISTENT ASTHMA WITHOUT COMPLICATION: ICD-10-CM

## 2024-01-30 LAB
ERYTHROCYTE [DISTWIDTH] IN BLOOD BY AUTOMATED COUNT: 12.9 % (ref 10–15)
HBA1C MFR BLD: 5.2 % (ref 0–5.6)
HCT VFR BLD AUTO: 42.9 % (ref 40–53)
HGB BLD-MCNC: 14.7 G/DL (ref 13.3–17.7)
MCH RBC QN AUTO: 34.3 PG (ref 26.5–33)
MCHC RBC AUTO-ENTMCNC: 34.3 G/DL (ref 31.5–36.5)
MCV RBC AUTO: 100 FL (ref 78–100)
PLATELET # BLD AUTO: 233 10E3/UL (ref 150–450)
RBC # BLD AUTO: 4.28 10E6/UL (ref 4.4–5.9)
WBC # BLD AUTO: 6.7 10E3/UL (ref 4–11)

## 2024-01-30 PROCEDURE — 80053 COMPREHEN METABOLIC PANEL: CPT | Performed by: PHYSICIAN ASSISTANT

## 2024-01-30 PROCEDURE — 83036 HEMOGLOBIN GLYCOSYLATED A1C: CPT | Performed by: PHYSICIAN ASSISTANT

## 2024-01-30 PROCEDURE — 99214 OFFICE O/P EST MOD 30 MIN: CPT | Mod: 25 | Performed by: PHYSICIAN ASSISTANT

## 2024-01-30 PROCEDURE — 99396 PREV VISIT EST AGE 40-64: CPT | Mod: 25 | Performed by: PHYSICIAN ASSISTANT

## 2024-01-30 PROCEDURE — 36415 COLL VENOUS BLD VENIPUNCTURE: CPT | Performed by: PHYSICIAN ASSISTANT

## 2024-01-30 PROCEDURE — 85027 COMPLETE CBC AUTOMATED: CPT | Performed by: PHYSICIAN ASSISTANT

## 2024-01-30 PROCEDURE — G0103 PSA SCREENING: HCPCS | Performed by: PHYSICIAN ASSISTANT

## 2024-01-30 PROCEDURE — 84443 ASSAY THYROID STIM HORMONE: CPT | Performed by: PHYSICIAN ASSISTANT

## 2024-01-30 PROCEDURE — 91320 SARSCV2 VAC 30MCG TRS-SUC IM: CPT | Performed by: PHYSICIAN ASSISTANT

## 2024-01-30 PROCEDURE — 80061 LIPID PANEL: CPT | Performed by: PHYSICIAN ASSISTANT

## 2024-01-30 PROCEDURE — 90480 ADMN SARSCOV2 VAC 1/ONLY CMP: CPT | Performed by: PHYSICIAN ASSISTANT

## 2024-01-30 PROCEDURE — 82306 VITAMIN D 25 HYDROXY: CPT | Performed by: PHYSICIAN ASSISTANT

## 2024-01-30 RX ORDER — EPINEPHRINE 0.3 MG/.3ML
0.3 INJECTION SUBCUTANEOUS PRN
Qty: 2 EACH | Refills: 0 | Status: SHIPPED | OUTPATIENT
Start: 2024-01-30

## 2024-01-30 RX ORDER — SIMVASTATIN 20 MG
TABLET ORAL
Qty: 135 TABLET | Refills: 3 | Status: SHIPPED | OUTPATIENT
Start: 2024-01-30

## 2024-01-30 RX ORDER — LISINOPRIL 5 MG/1
TABLET ORAL
Qty: 90 TABLET | Refills: 3 | Status: SHIPPED | OUTPATIENT
Start: 2024-01-30

## 2024-01-30 ASSESSMENT — ENCOUNTER SYMPTOMS
EYE PAIN: 0
HEADACHES: 0
SHORTNESS OF BREATH: 0
COUGH: 0
FEVER: 0
NAUSEA: 0
ARTHRALGIAS: 0
FREQUENCY: 0
WEAKNESS: 0
ABDOMINAL PAIN: 0
MYALGIAS: 0
JOINT SWELLING: 0
DYSURIA: 0
DIARRHEA: 0
NERVOUS/ANXIOUS: 0
HEMATURIA: 0
CHILLS: 0
PALPITATIONS: 0
SORE THROAT: 0
DIZZINESS: 0
CONSTIPATION: 0

## 2024-01-30 NOTE — TELEPHONE ENCOUNTER
Notification from [facility] for PA on    Disp Refills Start End ENEDINA    simvastatin (ZOCOR) 20 MG tablet 135 tablet 3 1/30/2024 -- No   Sig: TAKE 1&1/2 TABLETS (30 MG) BY MOUTH ONCE DAILY   Sent to pharmacy as: Simvastatin 20 MG Oral Tablet (ZOCOR)       Cover My Meds  Key [id number]

## 2024-01-30 NOTE — PROGRESS NOTES
Preventive Care Visit  [facility]  Nanette Espinosa PA-C, Physician Assistant - Medical  Jan 30, 2024      SUBJECTIVE:   Ulices is a 58 year old, presenting for the following:  Physical (Colonoscopy results, weight loss drugs, Baptism for snoring, )        1/30/2024     9:44 AM   Additional Questions   Roomed by ROSALIE Awan CMA([facility])     Healthy Habits:     Getting at least 3 servings of Calcium per day:  Yes    Bi-annual eye exam:  Yes    Dental care twice a year:  NO    Sleep apnea or symptoms of sleep apnea:  None    Diet:  Carbohydrate counting and Breakfast skipped    Frequency of exercise:  2-3 days/week    Duration of exercise:  45-60 minutes    Taking medications regularly:  Yes    Medication side effects:  None    Additional concerns today:  No      Today's PHQ-2 Score:       1/30/2024     9:39 AM   PHQ-2 ( 1999 [employer])   Q1: Little interest or pleasure in doing things 0   Q2: Feeling down, depressed or hopeless 0   PHQ-2 Score 0   Q1: Little interest or pleasure in doing things Not at all   Q2: Feeling down, depressed or hopeless Not at all   PHQ-2 Score 0         Social History     Tobacco Use    Smoking status: Never    Smokeless tobacco: Former     Quit date: 6/1/1996   Substance Use Topics    Alcohol use: Yes             1/28/2024     7:36 AM   Alcohol Use   Prescreen: >3 drinks/day or >7 drinks/week? Yes   AUDIT SCORE  7       Last PSA:   Prostate Specific Antigen Screen   Date Value Ref Range Status   03/13/2023 0.76 0.00 - 3.50 ng/mL Final   12/21/2021 0.79 0.00 - 3.50 ug/L Final       Reviewed orders with patient. Reviewed health maintenance and updated orders accordingly - Yes  Patient Active Problem List   Diagnosis    Asthma    Blood In Urine    Hyperlipidemia    Myocardial infarction (H)    Colon adenomas    Essential hypertension    Vitamin D deficiency    Class 2 severe obesity due to excess calories with serious comorbidity in adult (H)     Past Surgical  History:   Procedure Laterality Date    ARTHROSCOPIC REPAIR ACL Left     CARDIAC SURGERY      Cardiac Cath- no stents    COLONOSCOPY  December    HERNIA REPAIR  when a child       Social History     Tobacco Use    Smoking status: Never    Smokeless tobacco: Former     Quit date: 6/1/1996   Substance Use Topics    Alcohol use: Yes     Family History   Problem Relation Age of Onset    Hypertension Mother     Chronic Obstructive Pulmonary Disease Mother     Interstitial Lung Disease Father         hx of asbestos         Current Outpatient Medications   Medication Sig Dispense Refill    aspirin 81 MG EC tablet [ASPIRIN 81 MG EC TABLET] Take 1 tablet (81 mg total) by mouth daily.  0    calcium carbonate 500 mg, elemental, 1250 (500 Ca) MG tablet chewable       cholecalciferol, vitamin D3, (VITAMIN D3) 2,000 unit Tab [CHOLECALCIFEROL, VITAMIN D3, (VITAMIN D3) 2,000 UNIT TAB] Take 2 tablets (4,000 Units total) by mouth daily. AFTER 3 MONTHS RECHECK VITAMIN D LEVEL  0    EPINEPHrine (ANY BX GENERIC EQUIV) 0.3 MG/0.3ML injection 2-pack Inject 0.3 mLs (0.3 mg) into the muscle as needed for anaphylaxis 2 each 0    lisinopril (ZESTRIL) 5 MG tablet TAKE 1 TABLET (5 MG) BY MOUTH ONCE A DAY 90 tablet 3    mometasone furoate (ASMANEX HFA) 200 MCG/ACT inhaler Inhale 1 puff into the lungs 2 times daily 13 g 2    Multiple Vitamins-Minerals (PRESERVISION AREDS PO)       simvastatin (ZOCOR) 20 MG tablet TAKE 1&1/2 TABLETS (30 MG) BY MOUTH ONCE DAILY 135 tablet 3     Allergies   Allergen Reactions    Venom-Honey Bee [Bee Venom] Anaphylaxis       Reviewed and updated as needed this visit by clinical staff   Tobacco  Allergies  Meds              Reviewed and updated as needed this visit by Provider                    Review of Systems   Constitutional:  Negative for chills and fever.   HENT:  Negative for congestion, ear pain, hearing loss and sore throat.    Eyes:  Negative for pain and visual disturbance.   Respiratory:  Negative for  "cough and shortness of breath.    Cardiovascular:  Negative for chest pain and palpitations.   Gastrointestinal:  Negative for abdominal pain, constipation, diarrhea and nausea.   Genitourinary:  Positive for urgency. Negative for dysuria, frequency, genital sores, hematuria and penile discharge.   Musculoskeletal:  Negative for arthralgias, joint swelling and myalgias.   Skin:  Negative for rash.   Neurological:  Negative for dizziness, weakness and headaches.   Psychiatric/Behavioral:  The patient is not nervous/anxious.      Tenriism airway- interested in procedure, has hard time breathing in nose    He also is potentially interested in weight loss injectables  Struggles losing weight  OBJECTIVE:   /74   Pulse 63   Temp 98.1  F (36.7  C) (Oral)   Resp 12   Ht 1.816 m (5' 11.5\")   Wt 116.8 kg (257 lb 6.4 oz)   SpO2 96%   BMI 35.40 kg/m     Estimated body mass index is 35.4 kg/m  as calculated from the following:    Height as of this encounter: 1.816 m (5' 11.5\").    Weight as of this encounter: 116.8 kg (257 lb 6.4 oz).  Physical Exam  GENERAL: alert and no distress  EYES: Eyes grossly normal to inspection, PERRL and conjunctivae and sclerae normal  HENT: ear canals and TM's normal, nose and mouth without ulcers or lesions  NECK: no adenopathy, no asymmetry, masses, or scars  RESP: lungs clear to auscultation - no rales, rhonchi or wheezes  CV: regular rate and rhythm, normal S1 S2, no S3 or S4, no murmur, click or rub, no peripheral edema  ABDOMEN: soft, nontender, no hepatosplenomegaly, no masses and bowel sounds normal  MS: no gross musculoskeletal defects noted, no edema  SKIN: no suspicious lesions or rashes  NEURO: Normal strength and tone, mentation intact and speech normal  PSYCH: mentation appears normal, affect normal/bright  LYMPH: no cervical, supraclavicular, axillary, or inguinal adenopathy    Diagnostic Test Results:  none     ASSESSMENT/PLAN:   Routine general medical examination at a " health care facility  Labs updated today.  Vaccines- UTD  Colonoscopy- UTD, due for repeat in 6/2024 due to multiple polyps.  Prostate cancer- PSA updated today.  - CBC with platelets  - Comprehensive metabolic panel  - Hemoglobin A1c  - Prostate Specific Antigen Screen  - CBC with platelets  - Comprehensive metabolic panel  - Hemoglobin A1c  - Prostate Specific Antigen Screen    Colon adenomas  Patient followed by MALDONADO HOOKER, due for colonoscopy in 6/2024.    Hyperlipidemia LDL goal <130  Chronic issue, lipid panel updated today.  Continue Zocor daily.  - Lipid panel reflex to direct LDL Fasting  - simvastatin (ZOCOR) 20 MG tablet  Dispense: 135 tablet; Refill: 3  - Lipid panel reflex to direct LDL Fasting    Essential hypertension  Chronic issue, BP at goal, continue Lisinopril 5mg daily.  - lisinopril (ZESTRIL) 5 MG tablet  Dispense: 90 tablet; Refill: 3    Bee sting reaction, accidental or unintentional, subsequent encounter  Chronic issue, refills given.  - EPINEPHrine (ANY BX GENERIC EQUIV) 0.3 MG/0.3ML injection 2-pack  Dispense: 2 each; Refill: 0    Mild persistent asthma without complication  Chronic issue, stable, continue Asmanex daily.  - mometasone furoate (ASMANEX HFA) 200 MCG/ACT inhaler  Dispense: 13 g; Refill: 2    Class 2 severe obesity due to excess calories with serious comorbidity in adult, unspecified BMI (H)  Chronic issue, BMI at 35.  Check Hemoglobin A1C and TSH today.  Referred to Dr. Campos as interested in weight loss medications.  - Hemoglobin A1c  - TSH with free T4 reflex  - Hemoglobin A1c  - TSH with free T4 reflex    Vitamin D deficiency  Chronic issue, recheck Vit D level today  - Vitamin D Deficiency  - Vitamin D Deficiency      Patient has been advised of split billing requirements and indicates understanding: Yes      Counseling  Reviewed preventive health counseling, as reflected in patient instructions      BMI  Estimated body mass index is 35.4 kg/m  as calculated from the  "following:    Height as of this encounter: 1.816 m (5' 11.5\").    Weight as of this encounter: 116.8 kg (257 lb 6.4 oz).   Weight management plan: Patient referred to endocrine and/or weight management specialty      He reports that he has never smoked. He quit smokeless tobacco use about 27 years ago.            Signed Electronically by: Nanette Espinosa PA-C  Answers submitted by the patient for this visit:  Annual Preventive Visit (Submitted on 1/28/2024)  Chief Complaint: Annual Exam:  Blood in stool: No  heartburn: No  peripheral edema: No  mood changes: No  Skin sensation changes: No  impotence: No    "

## 2024-01-31 LAB
ALBUMIN SERPL BCG-MCNC: 4.3 G/DL (ref 3.5–5.2)
ALP SERPL-CCNC: 74 U/L (ref 40–150)
ALT SERPL W P-5'-P-CCNC: 55 U/L (ref 0–70)
ANION GAP SERPL CALCULATED.3IONS-SCNC: 10 MMOL/L (ref 7–15)
AST SERPL W P-5'-P-CCNC: 46 U/L (ref 0–45)
BILIRUB SERPL-MCNC: 0.3 MG/DL
BUN SERPL-MCNC: 15 MG/DL (ref 6–20)
CALCIUM SERPL-MCNC: 9.4 MG/DL (ref 8.6–10)
CHLORIDE SERPL-SCNC: 103 MMOL/L (ref 98–107)
CHOLEST SERPL-MCNC: 160 MG/DL
CREAT SERPL-MCNC: 0.79 MG/DL (ref 0.67–1.17)
DEPRECATED HCO3 PLAS-SCNC: 27 MMOL/L (ref 22–29)
EGFRCR SERPLBLD CKD-EPI 2021: >90 ML/MIN/1.73M2
FASTING STATUS PATIENT QL REPORTED: YES
GLUCOSE SERPL-MCNC: 97 MG/DL (ref 70–99)
HDLC SERPL-MCNC: 39 MG/DL
LDLC SERPL CALC-MCNC: 108 MG/DL
NONHDLC SERPL-MCNC: 121 MG/DL
POTASSIUM SERPL-SCNC: 4.7 MMOL/L (ref 3.4–5.3)
PROT SERPL-MCNC: 7.6 G/DL (ref 6.4–8.3)
PSA SERPL DL<=0.01 NG/ML-MCNC: 0.96 NG/ML (ref 0–3.5)
SODIUM SERPL-SCNC: 140 MMOL/L (ref 135–145)
TRIGL SERPL-MCNC: 64 MG/DL
TSH SERPL DL<=0.005 MIU/L-ACNC: 2.4 UIU/ML (ref 0.3–4.2)
VIT D+METAB SERPL-MCNC: 37 NG/ML (ref 20–50)

## 2024-02-01 ENCOUNTER — TELEPHONE (OUTPATIENT)
Dept: FAMILY MEDICINE | Facility: CLINIC | Age: 59
End: 2024-02-01
Payer: COMMERCIAL

## 2024-02-01 NOTE — TELEPHONE ENCOUNTER
----- Message from Nanette Espinosa PA-C sent at 1/30/2024  9:59 AM CST -----  Weight loss interest.    Nanette Espinosa PA-C

## 2024-02-01 NOTE — TELEPHONE ENCOUNTER
LMTCB  Please help schedule a 60 minute office visit with Dr. Campos for an Initial weight loss consult.    Next available is 3/28/24 at 1:20 pm - 1 pm arrival

## 2024-02-13 NOTE — TELEPHONE ENCOUNTER
Prior Authorization Not Needed per Insurance    Medication: SIMVASTATIN 20 MG PO TABS  Insurance Company: [employer] Non-Specialty PA's - Phone [phone] Fax [phone]  Expected CoPay: $    Pharmacy Filling the Rx: [id number] - [place] [address] [address]  Pharmacy Notified: yes  Patient Notified: no  Refill too soon until on or after 2/17/2024

## 2024-03-28 ENCOUNTER — OFFICE VISIT (OUTPATIENT)
Dept: FAMILY MEDICINE | Facility: CLINIC | Age: 59
End: 2024-03-28
Payer: COMMERCIAL

## 2024-03-28 VITALS
OXYGEN SATURATION: 97 % | HEART RATE: 62 BPM | RESPIRATION RATE: 18 BRPM | BODY MASS INDEX: 33.94 KG/M2 | WEIGHT: 250.6 LBS | DIASTOLIC BLOOD PRESSURE: 68 MMHG | SYSTOLIC BLOOD PRESSURE: 117 MMHG | HEIGHT: 72 IN

## 2024-03-28 DIAGNOSIS — E66.811 CLASS 1 OBESITY WITH SERIOUS COMORBIDITY AND BODY MASS INDEX (BMI) OF 34.0 TO 34.9 IN ADULT, UNSPECIFIED OBESITY TYPE: Primary | ICD-10-CM

## 2024-03-28 DIAGNOSIS — I10 ESSENTIAL HYPERTENSION: ICD-10-CM

## 2024-03-28 DIAGNOSIS — R06.83 SNORING: ICD-10-CM

## 2024-03-28 DIAGNOSIS — I21.9 MYOCARDIAL INFARCTION, UNSPECIFIED MI TYPE, UNSPECIFIED ARTERY (H): ICD-10-CM

## 2024-03-28 DIAGNOSIS — E78.5 HYPERLIPIDEMIA, UNSPECIFIED HYPERLIPIDEMIA TYPE: Chronic | ICD-10-CM

## 2024-03-28 PROCEDURE — 99215 OFFICE O/P EST HI 40 MIN: CPT | Performed by: FAMILY MEDICINE

## 2024-03-28 NOTE — PROGRESS NOTES
"    New Medical Weight Management Consult    PATIENT:  Ulices Gilbert  MRN:         [id number]  :         1965  RJ:         3/28/2024    Dear Nanette,    I had the pleasure of seeing your patient, Ulices Gilbert. Full intake/assessment was done to determine barriers to weight loss success and develop a treatment plan. Ulices Gilbert is a 58 year old male interested in treatment of medical problems associated with excess weight. He has a height of 5' 11.5\", a weight of 250 lbs 9.6 oz, and the calculated Body mass index is 34.46 kg/m .    ASSESSMENT/PLAN:  1. Class 1 obesity with serious comorbidity and body mass index (BMI) of 34.0 to 34.9 in adult, unspecified obesity type  Ulices presents for comprehensive weight management today.  His comorbidities include hypertension and hyperlipidemia.  He was able to set some long-term and short-term goals as discussed below.  He is very interested in medication mostly to help with maintenance as he is really struggled with this in the past.  It does look like he has [facility] will try to see if we can get Zepbound covered.  We did discuss other medications.  He is taking a trip at the end of this month and does not want to start anything until he gets back.  Will then have him follow-up in about 8 weeks.  - tirzepatide-Weight Management (ZEPBOUND) 2.5 MG/0.5ML prefilled pen; Inject 0.5 mLs (2.5 mg) Subcutaneous every 7 days  Dispense: 2 mL; Refill: 0    2. Myocardial infarction, unspecified MI type, unspecified artery (H)  It sounds like this was post strep infection/pericarditis.  He does not have coronary artery disease.    3. Essential hypertension  Well-controlled with current medication    4. Hyperlipidemia, unspecified hyperlipidemia type  On a statin.  5.snoring  There is a concern for sleep apnea so referral to sleep medicine is placed.    He has an overall long-term goal of reaching about 220 pounds.  (He started at 260 and is already lost 10 pounds " before seeing me).  He also would like some help with maintenance of sats where he struggled in the past.  We discussed short-term goals.  Behavioral: He does admit to doing some stress eating but he feels like this is less when he is exercising regularly which she is doing right now.  Nutritional: He is currently doing almost no carbs/ketogenic diet.  So no changes currently need to be made.  Activity: He is currently training for hiking trip so he is walking 4 miles 3 times a week and doing some weight training.  No new goal set in this category.    His history is that he feels like he is always struggled with his weight.  Both his parents were overweight.  He tends to be able to lose the weight but then once he goes back to his usual eating habits he will regain the weight.  He is quite active and likes to do trips like canoe trips and hiking trips.  He is taking a hiking trip to [place] in about 4 weeks.  He is already lost about 10 pounds.  His nutrition right now he is following a ketogenic diet.  He is keeping his carbs close to under 50.  His wife cooks and she cooks very healthy.  He has been doing some interval fasting as well.  He states his downfall is usually his craft beer and some chips or snacks at night.  He currently has stopped all alcohol to try to work on weight loss.  He does feel like he has a little bit of disordered eating mainly with stress eating.  He states he has a stressful job and he travels about 25% of the time.  That is a challenge for him as well to try to eat healthy and not overeat and stay active.  He does screen positive for possible sleep apnea and would be interested in getting a sleep study.  We discussed the importance of treating sleep apnea as this can be a barrier for weight loss.  He is interested in talking about medications.  He would like medications mainly for weight maintenance because he feels like he has been able to lose in the past he just cannot keep it off.   "I reviewed all medications with him including Zepbound, Wegovy, phentermine, topiramate, Wellbutrin, naltrexone, and metformin.  He is very interested in one of the injectables and I think he has [facility].  I did review with him potential adverse side effects.  Because it is his trip coming up, he will wait to start anything until he gets back.  In the meantime it looks like it needs a prior authorization.      He has the following co-morbidities:        2/14/2024     9:58 AM   --   I have the following health issues associated with obesity Pre-Diabetes    High Blood Pressure    High Cholesterol    Asthma   I have the following symptoms associated with obesity Lower Extremity Swelling            No data to display                    3/28/2024     1:00 PM   Referring Provider   Please name the provider who referred you to Medical Weight Management  If you do not know, please answer \"I Don't Know\" [facility]           2/14/2024     9:58 AM   Weight History   How concerned are you about your weight? Somewhat Concerned   I became overweight As a Teenager   The following factors have contributed to my weight gain Eating Wrong Types of Food    Eating Too Much    Stress   I have tried the following methods to lose weight Watching Portions or Calories    Exercise    Atkins-type Diet (Low Carb/High Protein)    Slim Fast or Other Liquid Diets    Fasting   My lowest weight since age 18 was 230   My highest weight since age 18 was 287   The most weight I have ever lost was (lbs) 57   I have the following family history of obesity/being overweight My father is overweight   How has your weight changed over the last year? Gained   How many pounds? 20           2/14/2024     9:58 AM   Diet Recall Review with Patient   If you do eat lunch, what types of food do you typically eat? Leftover - meat of some kind, and cheese   If you do eat supper, what types of food do you typically eat? My wife makes low carb fresh " made meals - really good stuff, and she does a great job doing from scratch cooking, very healthy actually   If you do snack, what types of food do you typically eat? Right now my downfall is bagged smart popcorn.  Evening.   How many glasses of juice do you drink in a typical day? 0   How many of glasses of milk do you drink in a typical day? 0   How many 8oz glasses of sugar containing drinks such as Moses-Aid/sweet tea do you drink in a day? 0   How many cans/bottles of sugar pop/soda/tea/sports drinks do you drink in a day? 0   How many cans/bottles of diet pop/soda/tea or sports drink do you drink in a day? 0   How often do you have a drink of alcohol? 4 or More Times a Week   If you do drink, how many drinks might you have in a day? 3-4           2/14/2024     9:58 AM   Eating Habits   Generally, my meals include foods like these bread, pasta, rice, potatoes, corn, crackers, sweet dessert, pop, or juice Less Than Weekly   Generally, my meals include foods like these fried meats, brats, burgers, french fries, pizza, cheese, chips, or ice cream Less Than Weekly   Eat fast food (like McDonalds, [employer]o, Taco Bell) Never   Eat at a buffet or sit-down restaurant A Few Times a Week   Eat most of my meals in front of the TV or computer Once a Week   Often skip meals, eat at random times, have no regular eating times Almost Everyday   Rarely sit down for a meal but snack or graze throughout Never   Eat extra snacks between meals A Few Times a Week   Eat most of my food at the end of the day Almost Everyday   Eat in the middle of the night or wake up at night to eat Never   Eat extra snacks to prevent or correct low blood sugar Never   Eat to prevent acid reflux or stomach pain Never   Worry about not having enough food to eat Never   I eat when I am depressed Never   I eat when I am stressed Once a Week   I eat when I am bored Once a Week   I eat when I am anxious Never   I eat when I am happy or as a reward Once a  Week   I feel hungry all the time even if I just have eaten Never   Feeling full is important to me Never   I finish all the food on my plate even if I am already full A Few Times a Week   I can't resist eating delicious food or walk past the good food/smell A Few Times a Week   I eat/snack without noticing that I am eating A Few Times a Week   I eat when I am preparing the meal Once a Week   I eat more than usual when I see others eating Once a Week   I have trouble not eating sweets, ice cream, cookies, or chips if they are around the house Almost Everyday   I think about food all day Once a Week   What foods, if any, do you crave? Chips/Crackers   Please list any other foods you crave? Popcorn -           2/14/2024     9:58 AM   Amount of Food   I feel out of control when eating Never   I eat a large amount of food, like a loaf of bread, a box of cookies, a pint/quart of ice cream, all at once Never   I eat a large amount of food even when I am not hungry Never   I eat rapidly Weekly   I eat alone because I feel embarrassed and do not want others to see how much I have eaten Never   I eat until I am uncomfortably full Monthly   I feel bad, disgusted, or guilty after I overeat Never           2/14/2024     9:58 AM   Activity/Exercise History   How much of a typical 12 hour day do you spend sitting? Most of the Day   How much of a typical 12 hour day do you spend lying down? Less Than Half the Day   How much of a typical day do you spend walking/standing? Less Than Half the Day   How many hours (not including work) do you spend on the TV/Video Games/Computer/Tablet/Phone? 2-3 Hours   How many times a week are you active for the purpose of exercise? 2-3 Times a Week   What keeps you from being more active? Lack of Time   How many total minutes do you spend doing some activity for the purpose of exercising when you exercise? More Than 30 Minutes       PAST MEDICAL HISTORY:  Past Medical History:   Diagnosis Date     Heart disease     Event vs. Disease    Hypertension     Uncomplicated asthma            2/14/2024     9:58 AM   Work/Social History Reviewed With Patient   My employment status is Full-Time   My job is Management   How much of your job is spent on the computer or phone? 75%   How many hours do you spend commuting to work daily? About an hour   What is your marital status? /In a Relationship   If in a relationship, is your significant other overweight? No   If you have children, are they overweight? Yes   Who do you live with? Wife and two grown children   Who does the food shopping? Wife           2/14/2024     9:58 AM   Mental Health History Reviewed With Patient   Have you ever been physically or sexually abused? No   How often in the past 2 weeks have you felt little interest or pleasure in doing things? Not at all   Over the past 2 weeks how often have you felt down, depressed, or hopeless? Not at all           2/14/2024     9:58 AM   Sleep History Reviewed With Patient   How many hours do you sleep at night? 6.5       MEDICATIONS:   Current Outpatient Medications   Medication Sig Dispense Refill    aspirin 81 MG EC tablet [ASPIRIN 81 MG EC TABLET] Take 1 tablet (81 mg total) by mouth daily.  0    calcium carbonate 500 mg, elemental, 1250 (500 Ca) MG tablet chewable       cholecalciferol, vitamin D3, (VITAMIN D3) 2,000 unit Tab [CHOLECALCIFEROL, VITAMIN D3, (VITAMIN D3) 2,000 UNIT TAB] Take 2 tablets (4,000 Units total) by mouth daily. AFTER 3 MONTHS RECHECK VITAMIN D LEVEL  0    EPINEPHrine (ANY BX GENERIC EQUIV) 0.3 MG/0.3ML injection 2-pack Inject 0.3 mLs (0.3 mg) into the muscle as needed for anaphylaxis 2 each 0    lisinopril (ZESTRIL) 5 MG tablet TAKE 1 TABLET (5 MG) BY MOUTH ONCE A DAY 90 tablet 3    mometasone furoate (ASMANEX HFA) 200 MCG/ACT inhaler Inhale 1 puff into the lungs 2 times daily 13 g 2    Multiple Vitamins-Minerals (PRESERVISION AREDS PO)       simvastatin (ZOCOR) 20 MG tablet TAKE  "1&1/2 TABLETS (30 MG) BY MOUTH ONCE DAILY 135 tablet 3       ALLERGIES:   Allergies   Allergen Reactions    Venom-Honey Bee [Bee Venom] Anaphylaxis       PHYSICAL EXAM:  /68   Pulse 62   Resp 18   Ht 1.816 m (5' 11.5\")   Wt 113.7 kg (250 lb 9.6 oz)   SpO2 97%   BMI 34.46 kg/m      Waist circumference:      Wt Readings from Last 4 Encounters:   03/28/24 113.7 kg (250 lb 9.6 oz)   01/30/24 116.8 kg (257 lb 6.4 oz)   03/13/23 122.5 kg (270 lb)   12/21/21 114.3 kg (252 lb)     A & O x 3  HEENT: NCAT, mucous membranes moist  Respirations unlabored  Location of obesity: Central Obesity    FOLLOW-UP:   8 weeks.    TIME: 45 min spent on evaluation, management, counseling, education, & motivational interviewing with greater than 50 % of the total time was spent on counseling and coordinating care    Sincerely,    Julianna Campos MD        "

## 2024-04-26 ENCOUNTER — TELEPHONE (OUTPATIENT)
Dept: SLEEP MEDICINE | Facility: CLINIC | Age: 59
End: 2024-04-26

## 2024-05-14 ENCOUNTER — MYC MEDICAL ADVICE (OUTPATIENT)
Dept: FAMILY MEDICINE | Facility: CLINIC | Age: 59
End: 2024-05-14
Payer: COMMERCIAL

## 2024-05-14 DIAGNOSIS — E66.811 CLASS 1 OBESITY WITH SERIOUS COMORBIDITY AND BODY MASS INDEX (BMI) OF 34.0 TO 34.9 IN ADULT, UNSPECIFIED OBESITY TYPE: Primary | ICD-10-CM

## 2024-05-23 ENCOUNTER — OFFICE VISIT (OUTPATIENT)
Dept: FAMILY MEDICINE | Facility: CLINIC | Age: 59
End: 2024-05-23
Payer: COMMERCIAL

## 2024-05-23 VITALS
WEIGHT: 243.4 LBS | OXYGEN SATURATION: 96 % | DIASTOLIC BLOOD PRESSURE: 69 MMHG | RESPIRATION RATE: 12 BRPM | TEMPERATURE: 98 F | HEIGHT: 72 IN | SYSTOLIC BLOOD PRESSURE: 117 MMHG | HEART RATE: 65 BPM | BODY MASS INDEX: 32.97 KG/M2

## 2024-05-23 DIAGNOSIS — E78.5 HYPERLIPIDEMIA, UNSPECIFIED HYPERLIPIDEMIA TYPE: Chronic | ICD-10-CM

## 2024-05-23 DIAGNOSIS — I21.9 MYOCARDIAL INFARCTION, UNSPECIFIED MI TYPE, UNSPECIFIED ARTERY (H): ICD-10-CM

## 2024-05-23 DIAGNOSIS — I10 ESSENTIAL HYPERTENSION: ICD-10-CM

## 2024-05-23 DIAGNOSIS — E66.811 CLASS 1 OBESITY WITH SERIOUS COMORBIDITY AND BODY MASS INDEX (BMI) OF 33.0 TO 33.9 IN ADULT, UNSPECIFIED OBESITY TYPE: Primary | ICD-10-CM

## 2024-05-23 PROCEDURE — 99214 OFFICE O/P EST MOD 30 MIN: CPT | Performed by: FAMILY MEDICINE

## 2024-05-23 PROCEDURE — G2211 COMPLEX E/M VISIT ADD ON: HCPCS | Performed by: FAMILY MEDICINE

## 2024-05-23 NOTE — PROGRESS NOTES
"    Return Medical Weight Management Note     Ulices Gilbert  MRN:  [id number]  :  1965  RJ:  2024    Dear Nanette Espinosa PA-C,    I had the pleasure of seeing your patient Ulices Gilbert. He is a 58 year old male who I am continuing to see for treatment of obesity related to:        2024     9:58 AM   --   I have the following health issues associated with obesity Pre-Diabetes    High Blood Pressure    High Cholesterol    Asthma   I have the following symptoms associated with obesity Lower Extremity Swelling       INTERVAL HISTORY:  Ulices comes in today for comprehensive weight management follow-up.  His intake weight was 250.  Today he is at 243 with a 7 pound weight loss.  He is quite happy with the results.  He was able to get zepbound and has done 2 injections so far.  There was a delay as he had a trip to [place] in and did want to start it while he was there.  He has been able to get that along with the 5 mg so far.  I did discuss with him there are some supply chain issues.  He is really good about his diet, he follows close to ketogenic and that is working well for him.  He is quite active.  He took a week off of hiking after his trip as he walked over 90 miles in a week.  He is now try to get back on a regular routine.  He does do strength training several days per week.  He is liking how things feel with the medication.  He is feeling pedrzaa faster and not thinking about food as often.  Not having any adverse side effects.    CURRENT WEIGHT:   243 lbs 6.4 oz                       No data to display                VITALS:  /69   Pulse 65   Temp 98  F (36.7  C) (Oral)   Resp 12   Ht 1.816 m (5' 11.5\")   Wt 110.4 kg (243 lb 6.4 oz)   SpO2 96%   BMI 33.47 kg/m      MEDICATIONS:   Current Outpatient Medications   Medication Sig Dispense Refill    aspirin 81 MG EC tablet [ASPIRIN 81 MG EC TABLET] Take 1 tablet (81 mg total) by mouth daily.  0    calcium carbonate 500 " mg, elemental, 1250 (500 Ca) MG tablet chewable       cholecalciferol, vitamin D3, (VITAMIN D3) 2,000 unit Tab [CHOLECALCIFEROL, VITAMIN D3, (VITAMIN D3) 2,000 UNIT TAB] Take 2 tablets (4,000 Units total) by mouth daily. AFTER 3 MONTHS RECHECK VITAMIN D LEVEL  0    EPINEPHrine (ANY BX GENERIC EQUIV) 0.3 MG/0.3ML injection 2-pack Inject 0.3 mLs (0.3 mg) into the muscle as needed for anaphylaxis 2 each 0    lisinopril (ZESTRIL) 5 MG tablet TAKE 1 TABLET (5 MG) BY MOUTH ONCE A DAY 90 tablet 3    mometasone furoate (ASMANEX HFA) 200 MCG/ACT inhaler Inhale 1 puff into the lungs 2 times daily 13 g 2    Multiple Vitamins-Minerals (PRESERVISION AREDS PO)       simvastatin (ZOCOR) 20 MG tablet TAKE 1&1/2 TABLETS (30 MG) BY MOUTH ONCE DAILY 135 tablet 3    tirzepatide-Weight Management (ZEPBOUND) 2.5 MG/0.5ML prefilled pen Inject 0.5 mLs (2.5 mg) Subcutaneous every 7 days 2 mL 0    tirzepatide-Weight Management (ZEPBOUND) 5 MG/0.5ML prefilled pen Inject 0.5 mLs (5 mg) Subcutaneous every 7 days 2 mL 0            No data to display                ASSESSMENT/Plan  1. Class 1 obesity with serious comorbidity and body mass index (BMI) of 33.0 to 33.9 in adult, unspecified obesity type  Ulices is off to a great start with a 7 pound weight loss with some lifestyle change and the addition of zepbound.  He will update me through [facility] when he is ready for his next refill how he is doing and if he wants to go up on the dose.  He will continue with regular exercise and strength training along with low-carb, high-protein meal plan.  To follow-up in 12 weeks.    2. Essential hypertension    3. Hyperlipidemia, unspecified hyperlipidemia type    4. Myocardial infarction, unspecified MI type, unspecified artery (H)    FOLLOW-UP:    12 weeks.        Sincerely,    Julianna Campos MD

## 2024-06-18 ENCOUNTER — TRANSFERRED RECORDS (OUTPATIENT)
Dept: HEALTH INFORMATION MANAGEMENT | Facility: CLINIC | Age: 59
End: 2024-06-18
Payer: COMMERCIAL

## 2024-06-24 ENCOUNTER — MYC MEDICAL ADVICE (OUTPATIENT)
Dept: FAMILY MEDICINE | Facility: CLINIC | Age: 59
End: 2024-06-24
Payer: COMMERCIAL

## 2024-06-24 DIAGNOSIS — E66.811 CLASS 1 OBESITY WITH SERIOUS COMORBIDITY AND BODY MASS INDEX (BMI) OF 33.0 TO 33.9 IN ADULT, UNSPECIFIED OBESITY TYPE: Primary | ICD-10-CM

## 2024-07-24 ENCOUNTER — MYC MEDICAL ADVICE (OUTPATIENT)
Dept: FAMILY MEDICINE | Facility: CLINIC | Age: 59
End: 2024-07-24
Payer: COMMERCIAL

## 2024-07-24 DIAGNOSIS — E66.811 CLASS 1 OBESITY WITH SERIOUS COMORBIDITY AND BODY MASS INDEX (BMI) OF 33.0 TO 33.9 IN ADULT, UNSPECIFIED OBESITY TYPE: Primary | ICD-10-CM

## 2024-07-24 ASSESSMENT — SLEEP AND FATIGUE QUESTIONNAIRES
HOW LIKELY ARE YOU TO NOD OFF OR FALL ASLEEP IN A CAR, WHILE STOPPED FOR A FEW MINUTES IN TRAFFIC: WOULD NEVER DOZE
HOW LIKELY ARE YOU TO NOD OFF OR FALL ASLEEP WHILE SITTING INACTIVE IN A PUBLIC PLACE: WOULD NEVER DOZE
HOW LIKELY ARE YOU TO NOD OFF OR FALL ASLEEP WHILE SITTING AND READING: MODERATE CHANCE OF DOZING
HOW LIKELY ARE YOU TO NOD OFF OR FALL ASLEEP WHILE SITTING QUIETLY AFTER LUNCH WITHOUT ALCOHOL: WOULD NEVER DOZE
HOW LIKELY ARE YOU TO NOD OFF OR FALL ASLEEP WHEN YOU ARE A PASSENGER IN A CAR FOR AN HOUR WITHOUT A BREAK: WOULD NEVER DOZE
HOW LIKELY ARE YOU TO NOD OFF OR FALL ASLEEP WHILE LYING DOWN TO REST IN THE AFTERNOON WHEN CIRCUMSTANCES PERMIT: HIGH CHANCE OF DOZING
HOW LIKELY ARE YOU TO NOD OFF OR FALL ASLEEP WHILE SITTING AND TALKING TO SOMEONE: WOULD NEVER DOZE
HOW LIKELY ARE YOU TO NOD OFF OR FALL ASLEEP WHILE WATCHING TV: MODERATE CHANCE OF DOZING

## 2024-07-29 ENCOUNTER — OFFICE VISIT (OUTPATIENT)
Dept: SLEEP MEDICINE | Facility: CLINIC | Age: 59
End: 2024-07-29
Attending: FAMILY MEDICINE
Payer: COMMERCIAL

## 2024-07-29 VITALS
RESPIRATION RATE: 16 BRPM | DIASTOLIC BLOOD PRESSURE: 73 MMHG | WEIGHT: 245 LBS | BODY MASS INDEX: 33.18 KG/M2 | HEART RATE: 71 BPM | HEIGHT: 72 IN | SYSTOLIC BLOOD PRESSURE: 113 MMHG | OXYGEN SATURATION: 96 %

## 2024-07-29 DIAGNOSIS — R29.818 SUSPECTED SLEEP APNEA: Primary | ICD-10-CM

## 2024-07-29 DIAGNOSIS — I10 ESSENTIAL HYPERTENSION: ICD-10-CM

## 2024-07-29 DIAGNOSIS — R06.83 SNORING: ICD-10-CM

## 2024-07-29 DIAGNOSIS — E66.811 CLASS 1 OBESITY DUE TO EXCESS CALORIES WITH SERIOUS COMORBIDITY AND BODY MASS INDEX (BMI) OF 33.0 TO 33.9 IN ADULT: ICD-10-CM

## 2024-07-29 DIAGNOSIS — R73.03 PREDIABETES: ICD-10-CM

## 2024-07-29 DIAGNOSIS — E66.09 CLASS 1 OBESITY DUE TO EXCESS CALORIES WITH SERIOUS COMORBIDITY AND BODY MASS INDEX (BMI) OF 33.0 TO 33.9 IN ADULT: ICD-10-CM

## 2024-07-29 PROCEDURE — 99205 OFFICE O/P NEW HI 60 MIN: CPT | Performed by: NURSE PRACTITIONER

## 2024-07-29 NOTE — PATIENT INSTRUCTIONS
"            MY TREATMENT INFORMATION FOR SLEEP APNEA-  Ulices Gilbert    DOCTOR : SANJU Smith CNP        Am I having a home sleep study?  --->Watch the video for the device you are using:    -/drop off device-   https://www.[employer].com/watch?v=yGGFBdELGhk          Frequently asked questions:  1. What is Obstructive Sleep Apnea (LULY)? LULY is the most common type of sleep apnea. Apnea means, \"without breath.\"  Apnea is most often caused by narrowing or collapse of the upper airway as muscles relax during sleep.   Almost everyone has occasional apneas. Most people with sleep apnea have had brief interruptions at night frequently for many years.  The severity of sleep apnea is related to how frequent and severe the events are.   2. What are the consequences of LULY? Symptoms include: feeling sleepy during the day, snoring loudly, gasping or stopping of breathing, trouble sleeping, and occasionally morning headaches or heartburn at night.  Sleepiness can be serious and even increase the risk of falling asleep while driving. Other health consequences may include development of high blood pressure and other cardiovascular disease in persons who are susceptible. Untreated LULY  can contribute to heart disease, stroke and diabetes.   3. What are the treatment options? In most situations, sleep apnea is a lifelong disease that must be managed with daily therapy. Medications are not effective for sleep apnea and surgery is generally not considered until other therapies have been tried. Your treatment is your choice . Continuous Positive Airway (CPAP) works right away and is the therapy that is effective in nearly everyone. An oral device to hold your jaw forward is usually the next most reliable option. Other options include postioning devices (to keep you off your back), weight loss, and surgery including a tongue pacing device. There is more detail about some of these options below.  4. Are my sleep " studies covered by insurance? Although we will request verification of coverage, we advise you also check in advance of the study to ensure there is coverage.    Important tips for those choosing CPAP and similar devices  REMEMBER-IF YOU RECEIVE A CALL FROM  [phone]-->IT IS TO SETUP A DEVICE  For new devices, sign up for device ANA PAULA to monitor your device for your followup visits  We encourage you to utilize the [employer] ana paula or website ( https://Recombine.MideoMe/ ) to monitor your therapy progress and share the data with your healthcare team when you discuss your sleep apnea.                                                    Know your equipment:  CPAP is continuous positive airway pressure that prevents obstructive sleep apnea by keeping the throat from collapsing while you are sleeping. In most cases, the device is  smart  and can slowly self-adjusts if your throat collapses and keeps a record every day of how well you are treated-this information is available to you and your care team.  BPAP is bilevel positive airway pressure that keeps your throat open and also assists each breath with a pressure boost to maintain adequate breathing.  Special kinds of BPAP are used in patients who have inadequate breathing from lung or heart disease. In most cases, the device is  smart  and can slowly self-adjusts to assist breathing. Like CPAP, the device keeps a record of how well you are treated.  Your mask is your connection to the device. You get to choose what feels most comfortable and the staff will help to make sure if fits. Here: are some examples of the different masks that are available: Magnetic mask aids may assist with use but there are safety issues that should be addressed when considering with magnets* ( see end of discussion).       Key points to remember on your journey with sleep apnea:  Sleep study.  PAP devices often need to be adjusted during a sleep study to show that they are effective and  adjusted right.  Good tips to remember: Try wearing just the mask during a quiet time during the day so your body adapts to wearing it. A humidifier is recommended for comfort in most cases to prevent drying of your nose and throat. Allergy medication from your provider may help you if you are having nasal congestion.  Getting settled-in. It takes more than one night for most of us to get used to wearing a mask. Try wearing just the mask during a quiet time during the day so your body adapts to wearing it. A humidifier is recommended for comfort in most cases. Our team will work with you carefully on the first day and will be in contact within 4 days and again at 2 and 4 weeks for advice and remote device adjustments. Your therapy is evaluated by the device each day.   Use it every night. The more you are able to sleep naturally for 7-8 hours, the more likely you will have good sleep and to prevent health risks or symptoms from sleep apnea. Even if you use it 4 hours it helps. Occasionally all of us are unable to use a medical therapy, in sleep apnea, it is not dangerous to miss one night.   Communicate. Call our skilled team on the number provided on the first day if your visit for problems that make it difficult to wear the device. Over 2 out of 3 patients can learn to wear the device long-term with help from our team. Remember to call our team or your sleep providers if you are unable to wear the device as we may have other solutions for those who cannot adapt to mask CPAP therapy. It is recommended that you sleep your sleep provider within the first 3 months and yearly after that if you are not having problems.   Use it for your health. We encourage use of CPAP masks during daytime quiet periods to allow your face and brain to adapt to the sensation of CPAP so that it will be a more natural sensation to awaken to at night or during naps. This can be very useful during the first few weeks or months of adapting to  CPAP though it does not help medically to wear CPAP during wakefulness and  should not be used as a strategy just to meet guidelines.  Take care of your equipment. Make sure you clean your mask and tubing using directions every day and that your filter and mask are replaced as recommended or if they are not working.     *Masks with magnets:  Updated Contraindications  Masks with magnetic components are contraindicated for use by patients where they, or anyone in close physical contact while using the mask, have the following:   Active medical implants that interact with magnets (i.e., pacemakers, implantable cardioverter defibrillators (ICD), neurostimulators, cerebrospinal fluid (CSF) shunts, insulin/infusion pumps)   Metallic implants/objects containing ferromagnetic material (i.e., aneurysm clips/flow disruption devices, embolic coils, stents, valves, electrodes, implants to restore hearing or balance with implanted magnets, ocular implants, metallic splinters in the eye)  Updated Warning  Keep the mask magnets at a safe distance of at least 6 inches (150 mm) away from implants or medical devices that may be adversely affected by magnetic interference. This warning applies to you or anyone in close physical contact with your mask. The magnets are in the frame and lower headgear clips, with a magnetic field strength of up to 400mT. When worn, they connect to secure the mask but may inadvertently detach while asleep.  Implants/medical devices, including those listed within contraindications, may be adversely affected if they change function under external magnetic fields or contain ferromagnetic materials that attract/repel to magnetic fields (some metallic implants, e.g., contact lenses with metal, dental implants, metallic cranial plates, screws, paul hole covers, and bone substitute devices). Consult your physician and  of your implant / other medical device for information on the potential adverse  effects of magnetic fields.    BESIDES CPAP, WHAT OTHER THERAPIES ARE THERE?    Positioning Device  Positioning devices are generally used when sleep apnea is mild and only occurs on your back.This example shows a pillow that straps around the waist. It may be appropriate for those whose sleep study shows milder sleep apnea that occurs primarily when lying flat on one's back. Preliminary studies have shown benefit but effectiveness at home may need to be verified by a home sleep test. These devices are generally not covered by medical insurance.  Examples of devices that maintain sleeping on the back to prevent snoring and mild sleep apnea.    Belt type body positioner  http://Flotype.Torqeedo/    Electronic reminder  http://nightshifttherapy.com/            Oral Appliance  What is oral appliance therapy?  An oral appliance device fits on your teeth at night like a retainer used after having braces. The device is made by a specialized dentist and requires several visits over 1-2 months before a manufactured device is made to fit your teeth and is adjusted to prevent your sleep apnea. Once an oral device is working properly, snoring should be improved. A home sleep test may be recommended at that time if to determine whether the sleep apnea is adequately treated.       Some things to remember:  -Oral devices are often, but not always, covered by your medical insurance. Be sure to check with your insurance provider.   -If you are referred for oral therapy, you will be given a list of specialized dentists to consider or you may choose to visit the Web site of the American Academy of Dental Sleep Medicine  -Oral devices are less likely to work if you have severe sleep apnea or are extremely overweight.     More detailed information  An oral appliance is a small acrylic device that fits over the upper and lower teeth  (similar to a retainer or a mouth guard). This device slightly moves jaw forward, which moves the base of  the tongue forward, opens the airway, improves breathing for effective treat snoring and obstructive sleep apnea in perhaps 7 out of 10 people .  The best working devices are custom-made by a dental device  after a mold is made of the teeth 1, 2, 3.  When is an oral appliance indicated?  Oral appliance therapy is recommended as a first-line treatment for patients with primary snoring, mild sleep apnea, and for patients with moderate sleep apnea who prefer appliance therapy to use of CPAP4, 5. Severity of sleep apnea is determined by sleep testing and is based on the number of respiratory events per hour of sleep.   How successful is oral appliance therapy?  The success rate of oral appliance therapy in patients with mild sleep apnea is 75-80% while in patients with moderate sleep apnea it is 50-70%. The chance of success in patients with severe sleep apnea is 40-50%. The research also shows that oral appliances have a beneficial effect on the cardiovascular health of LULY patients at the same magnitude as CPAP therapy7.  Oral appliances should be a second-line treatment in cases of severe sleep apnea, but if not completely successful then a combination therapy utilizing CPAP plus oral appliance therapy may be effective. Oral appliances tend to be effective in a broad range of patients although studies show that the patients who have the highest success are females, younger patients, those with milder disease, and less severe obesity. 3, 6.   Finding a dentist that practices dental sleep medicine  Specific training is available through the American Academy of Dental Sleep Medicine for dentists interested in working in the field of sleep. To find a dentist who is educated in the field of sleep and the use of oral appliances, near you, visit the Web site of the American Academy of Dental Sleep Medicine.    References  1. Ryan et al. Objectively measured vs self-reported compliance during oral  appliance therapy for sleep-disordered breathing. Chest 2013; 144(5): 3594-2621.  2. Rj, et al. Objective measurement of compliance during oral appliance therapy for sleep-disordered breathing. Thorax 2013; 68(1): 91-96.  3. Yoon et al. Mandibular advancement devices in 620 men and women with LULY and snoring: tolerability and predictors of treatment success. Chest 2004; 125: 5588-5602.  4. Madeline, et al. Oral appliances for snoring and LULY: a review. Sleep 2006; 29: 244-262.  5. Michele et al. Oral appliance treatment for LULY: an update. J Clin Sleep Med 2014; 10(2): 215-227.  6. Ana et al. Predictors of OSAH treatment outcome. J Dent Res 2007; 86: 8025-3314.      Weight Loss:   Your Body mass index is 33.69 kg/m .    Being overweight does not necessarily mean you will have health consequences.  Those who have BMI over 35 or over 27 with existing medical conditions carries greater risk.   Weight loss decreases severity of sleep apnea in most people with obesity. For those with mild obesity who have developed snoring with weight gain, even 15-30 pound weight loss can improve and occasionally milder eliminate sleep apnea.  Structured and life-long dietary and health habits are necessary to lose weight and keep healthier weight levels.     The Comprehensive Weight loss program offers all aspects of weight loss strategies including two Non-Surgical Weight Loss Programs: Medical Weight Management and our 24 Week Healthy Lifestyle Program:    Medical Weight Management: You will meet with a Medical Weight Management Provider, as well as a Registered Dietician. The program may include medication therapy, dietary education, recommended exercise and physical therapy programs, monthly support group meetings, and possible psychological counseling. Follow up visits with the provider or dietician are scheduled based on your progress and needs.    24 Week Healthy Lifestyle Program: This unique program is  designed to give you the support of weekly appointments and activities thru a 24-week period. It may include all of the components of the basic program (above), with the addition of 11 individual Health  Visits, 24-week access to the [employer] website for over 700 online classes, and monthly support group meetings. This program has an out-of-pocket expense of $499 to cover the items that can not be billed to insurance (health coaches and [employer] access), and is non-refundable/non-transferable (you may be able to use a Health Savings Account; ask your HSA provider). There may be an optional meal replacement plan prescribed as well.   Surgical management achieves meaningful long-term weight loss and improvement in health risks in most patients with more severe obesity.      Sleep Apnea Surgery:    Surgery for obstructive sleep apnea is considered generally only when other therapies fail to work. Surgery may be discussed with you if you are having a difficult time tolerating CPAP and or when there is an abnormal structure that requires surgical correction.  Nose and throat surgeries often enlarge the airway to prevent collapse.  Most of these surgeries create pain for 1-2 weeks and up to half of the most common surgeries are not effective throughout life.  You should carefully discuss the benefits and drawbacks to surgery with your sleep provider and surgeon to determine if it is the best solution for you.   More information  Surgery for LULY is directed at areas that are responsible for narrowing or complete obstruction of the airway during sleep.  There are a wide range of procedures available to enlarge and/or stabilize the airway to prevent blockage of breathing in the three major areas where it can occur: the palate, tongue, and nasal regions.  Successful surgical treatment depends on the accurate identification of the factors responsible for obstructive sleep apnea in each person.  A personalized approach  is required because there is no single treatment that works well for everyone.  Because of anatomic variation, consultation with an examination by a sleep surgeon is a critical first step in determining what surgical options are best for each patient.  In some cases, examination during sedation may be recommended in order to guide the selection of procedures.  Patients will be counseled about risks and benefits as well as the typical recovery course after surgery. Surgery is typically not a cure for a person s LULY.  However, surgery will often significantly improve one s LULY severity (termed  success rate ).  Even in the absence of a cure, surgery will decrease the cardiovascular risk associated with OSA7; improve overall quality of life8 (sleepiness, functionality, sleep quality, etc).      Palate Procedures:  Patients with LULY often have narrowing of their airway in the region of their tonsils and uvula.  The goals of palate procedures are to widen the airway in this region as well as to help the tissues resist collapse.  Modern palate procedure techniques focus on tissue conservation and soft tissue rearrangement, rather than tissue removal.  Often the uvula is preserved in this procedure. Residual sleep apnea is common in patient after pharyngoplasty with an average reduction in sleep apnea events of 33%2.      Tongue Procedures:  ExamWhile patients are awake, the muscles that surround the throat are active and keep this region open for breathing. These muscles relax during sleep, allowing the tongue and other structures to collapse and block breathing.  There are several different tongue procedures available.  Selection of a tongue base procedure depends on characteristics seen on physical exam.  Generally, procedures are aimed at removing bulky tissues in this area or preventing the back of the tongue from falling back during sleep.  Success rates for tongue surgery range from 50-62%3.    Hypoglossal Nerve  Stimulation:  Hypoglossal nerve stimulation has recently received approval from the [place] Food and Drug Administration for the treatment of obstructive sleep apnea.  This is based on research showing that the system was safe and effective in treating sleep apnea6.  Results showed that the median AHI score decreased 68%, from 29.3 to 9.0. This therapy uses an implant system that senses breathing patterns and delivers mild stimulation to airway muscles, which keeps the airway open during sleep.  The system consists of three fully implanted components: a small generator (similar in size to a pacemaker), a breathing sensor, and a stimulation lead.  Using a small handheld remote, a patient turns the therapy on before bed and off upon awakening.    Candidates for this device must be greater than 18 years of age, have moderate to severe obstructive sleep apnea with less than 25% central events  (AHI between 15-65), BMI less than 35, have tried CPAP/oral appliance for at least 8 weeks without success, and have appropriate upper airway anatomy (determined by a sleep endoscopy performed by Dr. Jean Christopher or Dr. Daniele Moore).    Nasal Procedures:  Nasal obstruction can interfere with nasal breathing during the day and night.  Studies have shown that relief of nasal obstruction can improve the ability of some patients to tolerate positive airway pressure therapy for obstructive sleep apnea1.  Treatment options include medications such as nasal saline, topical corticosteroid and antihistamine sprays, and oral medications such as antihistamines or decongestants. Non-surgical treatments can include external nasal dilators for selected patients. If these are not successful by themselves, surgery can improve the nasal airway either alone or in combination with these other options.        Combination Procedures:  Combination of surgical procedures and other treatments may be recommended, particularly if patients have more  than one area of narrowing or persistent positional disease.  The success rate of combination surgery ranges from 66-80%2,3.    References  Tiana DAVALOS. The Role of the Nose in Snoring and Obstructive Sleep Apnoea: An Update.  Eur Arch Otorhinolaryngol. 2011; 268: 1365-73.   Cash SM; Lizabeth JA; Loida JR; Pallanch JF; Glenn MB; Juliann SG; Elise TRAN. Surgical modifications of the upper airway for obstructive sleep apnea in adults: a systematic review and meta-analysis. SLEEP 2010;33(10):8706-1307. Nessa BATES. Hypopharyngeal surgery in obstructive sleep apnea: an evidence-based medicine review.  Arch Otolaryngol Head Neck Surg. 2006 Feb;132(2):206-13.  Ashu YH1, Ray Y, Josh ROXIE. The efficacy of anatomically based multilevel surgery for obstructive sleep apnea. Otolaryngol Head Neck Surg. 2003 Oct;129(4):327-35.  Kezirian E, Goldberg A. Hypopharyngeal Surgery in Obstructive Sleep Apnea: An Evidence-Based Medicine Review. Arch Otolaryngol Head Neck Surg. 2006 Feb;132(2):206-13.  Sd PJ et al. Upper-Airway Stimulation for Obstructive Sleep Apnea.  N Engl J Med. 2014 Jan 9;370(2):139-49.  Mar Y et al. Increased Incidence of Cardiovascular Disease in Middle-aged Men with Obstructive Sleep Apnea. Am J Respir Crit Care Med; 2002 166: 159-165  Dacosta EM et al. Studying Life Effects and Effectiveness of Palatopharyngoplasty (SLEEP) study: Subjective Outcomes of Isolated Uvulopalatopharyngoplasty. Otolaryngol Head Neck Surg. 2011; 144: 623-631.        WHAT IF I ONLY HAVE SNORING?    Mandibular advancement devices, lateral sleep positioning, long-term weight loss and treatment of nasal allergies have been shown to improve snoring.  Exercising tongue muscles with a game (https://apps.Tutee/us/ana paula/soundly-reduce-snoring/ug0927103685) or stimulating the tongue during the day with a device (https://doi.org/10.3390/owd51288871) have improved snoring in some  individuals.  https://www.PlayLab.SecretSales/  https://www.sleepfoundation.org/best-anti-snoring-mouthpieces-and-mouthguards    Remember to Drive Safe... Drive Alive     Sleep health profoundly affects your health, mood, and your safety.  Thirty three percent of the population (one in three of us) is not getting enough sleep and many have a sleep disorder. Not getting enough sleep or having an untreated / undertreated sleep condition may make us sleepy without even knowing it. In fact, our driving could be dramatically impaired due to our sleep health. As your provider, here are some things I would like you to know about driving:     Here are some warning signs for impairment and dangerous drowsy driving:              -Having been awake more than 16 hours               -Looking tired               -Eyelid drooping              -Head nodding (it could be too late at this point)              -Driving for more than 30 minutes     Some things you could do to make the driving safer if you are experiencing some drowsiness:              -Stop driving and rest              -Call for transportation              -Make sure your sleep disorder is adequately treated     Some things that have been shown NOT to work when experiencing drowsiness while driving:              -Turning on the radio              -Opening windows              -Eating any  distracting  /  entertaining  foods (e.g., sunflower seeds, candy, or any other)              -Talking on the phone      Your decision may not only impact your life, but also the life of others. Please, remember to drive safe for yourself and all of us.               Your BMI is Body mass index is 33.69 kg/m .          What is BMI?  Body mass index (BMI) is one way to tell whether you are at a healthy weight, overweight, or obese. It measures your weight in relation to your height.  A BMI of 18.5 to 24.9 is in the healthy range. A person with a BMI of 25 to 29.9 is considered overweight,  and someone with a BMI of 30 or greater is considered obese.  Another way to find out if you are at risk for health problems caused by overweight and obesity is to measure your waist. If you are a woman and your waist is more than 35 inches, or if you are a man and your waist is more than 40 inches, your risk of disease may be higher.  More than two-thirds of American adults are considered overweight or obese. Being overweight or obese increases the risk for further weight gain.  Excess weight may lead to heart disease and diabetes. Creating and following plans for healthy eating and physical activity may help you improve your health.    Methods for maintaining or losing weight.  Weight control is part of healthy lifestyle and includes exercise, emotional health, and healthy eating habits.  Careful eating habits lifelong is the mainstay of weight control.  Though there are significant health benefits from weight loss, long-term weight loss with diet alone may be very difficult to achieve- studies show long-term success with dietary management in less than 10% of people. Attaining a healthy weight may be especially difficult to achieve in those with severe obesity. In some cases, medications, devices and surgical management might be considered.    What can you do?  If you are overweight or obese and are interested in methods for weight loss, you should discuss this with your provider. In addition, we recommend that you review healthy life styles and methods for weight loss available through the National Institutes of Health patient information sites:   http://win.niddk.nih.gov/publications/index.htm

## 2024-07-29 NOTE — PROGRESS NOTES
Outpatient Sleep Medicine Consultation:      Name: Ulices Gilbert MRN# [id number]   Age: 59 year old YOB: 1965     Date of Consultation: July 29, 2024  Consultation is requested by: Julianna Campos MD  [address] [place] [address] Julianna Campos  Primary care provider: Nanette Espinosa       Reason for Sleep Consult:     Ulices Gilbert is sent by Julianna Campos for a sleep consultation regarding snoring.    Patient s Reason for visit  Ulices Gilbert main reason for visit: Would like to get tested for sleep apnea  Patient states problem(s) started: Camping - friend mentioned that i stop breathing and wake myself up  Ulices Gilbert's goals for this visit: Determine if I need to get a sleep apnea test           Assessment and Plan:     Summary Sleep Diagnoses and Recommendations:    ICD-10-CM    1. Suspected sleep apnea  R29.818 HST-Home Sleep Apnea Test - Noxturnal Returnable      2. Essential hypertension  I10 HST-Home Sleep Apnea Test - Noxturnal Returnable      3. Class 1 obesity due to excess calories with serious comorbidity and body mass index (BMI) of 33.0 to 33.9 in adult  E66.09 HST-Home Sleep Apnea Test - Noxturnal Returnable    Z68.33       4. Snoring  R06.83 Adult Sleep Eval & Management  Referral     HST-Home Sleep Apnea Test - Noxturnal Returnable      5. Prediabetes  R73.03 HST-Home Sleep Apnea Test - Noxturnal Returnable      Plans:   Plans for Ulices Gilbert include a home sleep test after which Ulices will send a message through [employer] that he has completed the sleep study.  Ulices has a high STOP-BANG score, with positives for snoring, fatigue, obstructions, blood pressure, age, neck, gender.  We discussed different types of treatments for sleep apnea, and depending upon the results of his sleep study, Ulices may be more interested in the mandibular advancement device as he travels considerably for his job.  Recommend that Ulices optimize his sleep schedule as  well as his sleep hygiene practices to mitigate any further sleep disruption.  Commend that Ulices employ safe driving practices such as not driving a motor vehicle should he become drowsy.  Recommend BMI of 30.      Summary Counseling:    Sleep Testing Reviewed  Obstructive Sleep Apnea Reviewed  Complications of Untreated Sleep Apnea Reviewed      Patient will follow up with a [employer] message after completing sleep study.  Patient will be contacted and therapy will be initiated if indicated  SANJU Nunn CNP    Total time spent reviewing medical records, history and physical examination, review of previous testing and interpretation as well as documentation on this date: 60 minutes    CC: Julianna Campos          History of Present Illness:     Ulices Gilbert presents to the sleep medicine clinic with concerns of possibly having sleep apnea.  He was recently camping with friends who brought it to his attention that he snored loudly and hence stopped breathing during his sleep.    Ulices Gilbert has a medical history notable for an MI in 2003 (Per medical records myocardial infarction versus bacterial endocarditis), hyperlipidemia, hypertension, vitamin D deficiency, class I obesity with serious comorbidity avidity, and prediabetes.  It is noted that he has rectified his prediabetes.  Patient has never had his tonsils surgically removed.    Patient has lost 25 pounds with the assistance of tirzepatide and exercise, he hopes to lose another 30 pounds.    Although patient does not describe any issues with sleep initiation and has only mild sleep maintenance insomnia, he is concerned with severe issues of waking up too early.  He finds it difficult to return to sleep.  On those occasions he does spend time surfing the Internet.  We did spend time discussing screens and their role in promoting sleep fragmentation.    Ulices suffers from socially disruptive snoring, snort arousals, witnessed episodes of apnea,  multiple nocturnal awakenings for elimination, afternoon sleepiness napping both intentionally and unintentionally, cough and anxiety.    Patient did have an issue with a fractured nose from a bookcase falling on his face as a child causing septal deviation and likely internal damage to his nose.  He does fine use of nasal strips beneficial.    [place] Sleepiness Scale  Total score - [place]:7   (Less than 10 normal)     Insomnia Severity Scale  LUCY Total Score: 12  (normal 0-7, mild 8-14, moderate 15-21, severe 22-28)    STOP-BANG score 7/8 indicating a very high likelihood for sleep apnea.  We discussed basic pathophysiology for central sleep apnea as well as that for obstructive sleep apnea.  We discussed implications for untreated sleep apnea and reviewed surgical as well as nonsurgical treatments for sleep apnea.    Social History:  Manages [employer]    Past Sleep Evaluations:  None    SLEEP-WAKE SCHEDULE:     Work/School Days: Patient goes to school/work: Yes   Usually gets into bed at 9:30 - 10 pm  Takes patient about 10 minutes - not long at all to fall asleep  Has trouble falling asleep Rarely nights per week  Wakes up in the middle of the night a couple of times times.  Wakes up due to Snorting self awake;Use the bathroom  He has trouble falling back asleep Occasionally - after 3am, harder to fall back asleep times a week. Screens  It usually takes   to get back to sleep  Patient is usually up at 5:30am  Uses alarm: Yes    Weekends/Non-work Days/All Other Days:  Usually gets into bed at 9:30 - 10 pm   Takes patient about 10 min or less to fall asleep  Patient is usually up at 5:30 am  Uses alarm: No    Sleep Need  Patient gets  Apple Sleep Watch says 6 - 7 hours on average sleep on average [place]  Patient thinks he needs about 7 hr is my target sleep    Ulices Gilbert prefers to sleep in this position(s): Side   Patient states they do the following activities in bed: Use phone, computer, or  tablet    Naps  Patient takes a purposeful nap 0 times a week and naps are usually 1/2 hour in duration  He feels better after a nap: Yes  He dozes off unintentionally 4 days per week  Evening- Surfing the internet or reading  Patient has had a driving accident or near-miss due to sleepiness/drowsiness: No  [place]      SLEEP DISRUPTIONS:    Breathing/Snoring      Patient snores:Yes    Other people complain about his snoring: No  Patient has been told he stops breathing in his sleep:Yes  He has issues with the following:  .    Movement:  Patient gets pain, discomfort, with an urge to move:  No restless legs symptoms  It happens when he is resting:  No  It happens more at night:  No  Patient has been told he kicks his legs at night:  No     Behaviors in Sleep:  Ulices Gilbert has experienced the following behaviors while sleeping:    He has experienced sudden muscle weakness during the day: No  Pt denies bruxism, sleep talking, sleep walking, and dream enactment behavior. Pt denies sleep paralysis, hypnagogue and cataplexy.       Is there anything else you would like your sleep provider to know: nope      CAFFEINE AND OTHER SUBSTANCES:    Patient consumes caffeinated beverages per day:  I sip tea most of the day - only use two tea bags though  Last caffeine use is usually: 3pm  List of any prescribed or over the counter stimulants that patient takes: none  List of any prescribed or over the counter sleep medication patient takes: none  List of previous sleep medications that patient has tried: none  Patient drinks alcohol to help them sleep: No  Patient drinks alcohol near bedtime: Yes    Family History:  Patient has a family member been diagnosed with a sleep disorder: No      Dad snored      SCALES:    EPWORTH SLEEPINESS SCALE         7/24/2024     8:13 AM    [place] Sleepiness Scale Ishan Barajas  [id number]<br>ESS - [place]/English - Final version - 21 Nov 07 - [facility]  [place]   Sitting and reading Moderate chance of dozing   Watching TV Moderate chance of dozing   Sitting, inactive in a public place (e.g. a theatre or a meeting) Would never doze   As a passenger in a car for an hour without a break Would never doze   Lying down to rest in the afternoon when circumstances permit High chance of dozing   Sitting and talking to someone Would never doze   Sitting quietly after a lunch without alcohol Would never doze   In a car, while stopped for a few minutes in traffic Would never doze   [place] Score (MC) 7   [place] Score (Sleep) 7         INSOMNIA SEVERITY INDEX (LUCY)          7/24/2024     6:47 AM   Insomnia Severity Index (LUCY)   Difficulty falling asleep 0   Difficulty staying asleep 1   Problems waking up too early 3   How SATISFIED/DISSATISFIED are you with your CURRENT sleep pattern? 2   How NOTICEABLE to others do you think your sleep problem is in terms of impairing the quality of your life? 2   How WORRIED/DISTRESSED are you about your current sleep problem? 2   To what extent do you consider your sleep problem to INTERFERE with your daily functioning (e.g. daytime fatigue, mood, ability to function at work/daily chores, concentration, memory, mood, etc.) CURRENTLY? 2   LUCY Total Score 12       Guidelines for Scoring/Interpretation:  Total score categories:  0-7 = No clinically significant insomnia   8-14 = Subthreshold insomnia   15-21 = Clinical insomnia (moderate severity)  22-28 = Clinical insomnia (severe)  Used via courtesy of www.Arkivumealth.va.gov with permission from Heriberto Hendrix PhD., [facility]      STOP BANG         7/24/2024     8:15 AM   STOP BANG Questionnaire (  2008, the American Society of Anesthesiologists, Inc. [employer]   1. Snoring - Do you snore loudly (louder than talking or loud enough to be heard through closed doors)? No   2. Tired - Do you often feel tired, fatigued, or sleepy during daytime? Yes   3.  "Observed - Has anyone observed you stop breathing during your sleep? Yes   4. Blood pressure - Do you have or are you being treated for high blood pressure? Yes   5. BMI - BMI more than 35 kg/m2? No   6. Age - Age over 50 yr old? Yes   7. Neck circumference - Neck circumference greater than 40 cm? No   8. Gender - Gender male? Yes   STOP BANG Score (MC): 4 (High risk of LULY)         GAD7         No data to display                  CAGE-AID         No data to display                CAGE-AID reprinted with permission from the [place] Medical Journal, JAVIER Iniguez. and CATHY Griffith, \"Conjoint screening questionnaires for alcohol and drug abuse\" [place] Medical Journal 94: 135-140, 1995.      PATIENT HEALTH QUESTIONNAIRE-9 (PHQ - 9)        11/2/2020    10:54 AM   PHQ-9 ([employer])   1.  Little interest or pleasure in doing things 0   2.  Feeling down, depressed, or hopeless 0       Developed by Mera Whitehead, Nydia Lopez, Eric Price and colleagues, with an educational richar from [employer] No permission required to reproduce, translate, display or distribute.        Allergies:    Allergies   Allergen Reactions    Venom-Honey Bee [Bee Venom] Anaphylaxis       Medications:    Current Outpatient Medications   Medication Sig Dispense Refill    aspirin 81 MG EC tablet [ASPIRIN 81 MG EC TABLET] Take 1 tablet (81 mg total) by mouth daily.  0    calcium carbonate 500 mg, elemental, 1250 (500 Ca) MG tablet chewable       cholecalciferol, vitamin D3, (VITAMIN D3) 2,000 unit Tab [CHOLECALCIFEROL, VITAMIN D3, (VITAMIN D3) 2,000 UNIT TAB] Take 2 tablets (4,000 Units total) by mouth daily. AFTER 3 MONTHS RECHECK VITAMIN D LEVEL  0    EPINEPHrine (ANY BX GENERIC EQUIV) 0.3 MG/0.3ML injection 2-pack Inject 0.3 mLs (0.3 mg) into the muscle as needed for anaphylaxis 2 each 0    lisinopril (ZESTRIL) 5 MG tablet TAKE 1 TABLET (5 MG) BY MOUTH ONCE A DAY 90 tablet 3    mometasone furoate (ASMANEX HFA) 200 MCG/ACT inhaler " Inhale 1 puff into the lungs 2 times daily 13 g 2    Multiple Vitamins-Minerals (PRESERVISION AREDS PO)       simvastatin (ZOCOR) 20 MG tablet TAKE 1&1/2 TABLETS (30 MG) BY MOUTH ONCE DAILY 135 tablet 3    tirzepatide-Weight Management (ZEPBOUND) 10 MG/0.5ML prefilled pen Inject 0.5 mLs (10 mg) subcutaneously every 7 days 2 mL 0    tirzepatide-Weight Management (ZEPBOUND) 2.5 MG/0.5ML prefilled pen Inject 0.5 mLs (2.5 mg) Subcutaneous every 7 days 2 mL 0    tirzepatide-Weight Management (ZEPBOUND) 5 MG/0.5ML prefilled pen Inject 0.5 mLs (5 mg) Subcutaneous every 7 days 2 mL 0    tirzepatide-Weight Management (ZEPBOUND) 7.5 MG/0.5ML prefilled pen Inject 0.5 mLs (7.5 mg) Subcutaneous every 7 days 2 mL 0       Problem List:  Patient Active Problem List    Diagnosis Date Noted    Class 1 obesity with serious comorbidity and body mass index (BMI) of 33.0 to 33.9 in adult 01/30/2024     Priority: Medium    Vitamin D deficiency 04/02/2019     Priority: Medium    Essential hypertension 01/09/2019     Priority: Medium    Blood In Urine      Priority: Medium     Followed by Urology        Colon adenomas 08/18/2017     Priority: Medium     Times two on 8-14-17 colonoscopy        Myocardial infarction (H) 11/22/2016     Priority: Medium    Hyperlipidemia      Priority: Medium     Created by Conversion        Asthma      Priority: Medium     Created by Conversion            Past Medical/Surgical History:  Past Medical History:   Diagnosis Date    Heart disease     Event vs. Disease    Hypertension     Uncomplicated asthma      Past Surgical History:   Procedure Laterality Date    ARTHROSCOPIC REPAIR ACL Left     CARDIAC SURGERY      Cardiac Cath- no stents    COLONOSCOPY  December    HERNIA REPAIR  when a child       Social History:  Social History     Socioeconomic History    Marital status:      Spouse name: Not on file    Number of children: Not on file    Years of education: Not on file    Highest education level: Not  on file   Occupational History    Not on file   Tobacco Use    Smoking status: Never    Smokeless tobacco: Former     Quit date: 6/1/1996   Vaping Use    Vaping status: Never Used   Substance and Sexual Activity    Alcohol use: Yes    Drug use: Never    Sexual activity: Yes     Partners: Female     Birth control/protection: Male Surgical   Other Topics Concern    Parent/sibling w/ CABG, MI or angioplasty before 65F 55M? No   Social History Narrative    Not on file     Social Determinants of Health     Financial Resource Strain: Low Risk  (1/28/2024)    Financial Resource Strain     Within the past 12 months, have you or your family members you live with been unable to get utilities (heat, electricity) when it was really needed?: No   Food Insecurity: Low Risk  (1/28/2024)    Food Insecurity     Within the past 12 months, did you worry that your food would run out before you got money to buy more?: No     Within the past 12 months, did the food you bought just not last and you didn t have money to get more?: No   Transportation Needs: Low Risk  (1/28/2024)    Transportation Needs     Within the past 12 months, has lack of transportation kept you from medical appointments, getting your medicines, non-medical meetings or appointments, work, or from getting things that you need?: No   Physical Activity: Not on file   Stress: Not on file   Social Connections: Not on file   Interpersonal Safety: Low Risk  (1/30/2024)    Interpersonal Safety     Do you feel physically and emotionally safe where you currently live?: Yes     Within the past 12 months, have you been hit, slapped, kicked or otherwise physically hurt by someone?: No     Within the past 12 months, have you been humiliated or emotionally abused in other ways by your partner or ex-partner?: No   Housing Stability: Low Risk  (1/28/2024)    Housing Stability     Do you have housing? : Yes     Are you worried about losing your housing?: No       Family  "History:  Family History   Problem Relation Age of Onset    Hypertension Mother     Chronic Obstructive Pulmonary Disease Mother     Interstitial Lung Disease Father         hx of asbestos       Review of Systems:  A complete review of systems reviewed by me is negative with the exeption of what has been mentioned in the history of present illness.  In the last TWO WEEKS have you experienced any of the following symptoms?  Fevers: No  Night Sweats: No  Weight Gain: No  Pain at Night: No  Double Vision: No  Changes in Vision: No  Difficulty Breathing through Nose: Yes Fx nose as child. Is mouth breather  Sore Throat in Morning: No  Dry Mouth in the Morning: No  Shortness of Breath Lying Flat: No  Shortness of Breath With Activity: No  Awakening with Shortness of Breath: No  Increased Cough: Yes  Heart Racing at Night: No  Swelling in Feet or Legs: No  Diarrhea at Night: No  Heartburn at Night: No  Urinating More than Once at Night: No  Losing Control of Urine at Night: No  Joint Pains at Night: No  Headaches in Morning: No  Weakness in Arms or Legs: No  Depressed Mood: No  Anxiety: Yes     Physical Examination:  Vitals: /73   Pulse 71   Resp 16   Ht 1.816 m (5' 11.5\")   Wt 111.1 kg (245 lb)   SpO2 96%   BMI 33.69 kg/m    BMI= Body mass index is 33.69 kg/m .         Physical Exam  Vitals and nursing note reviewed.   Constitutional:       General: He is awake. He is not in acute distress.     Appearance: Normal appearance. He is well-developed and well-groomed. He is obese. He is not ill-appearing, toxic-appearing or diaphoretic.   HENT:      Head: Normocephalic and atraumatic.      Right Ear: External ear normal.      Left Ear: External ear normal.      Nose: Nose normal.      Mouth/Throat:      Mouth: Mucous membranes are moist.      Pharynx: Oropharynx is clear.   Cardiovascular:      Rate and Rhythm: Normal rate and regular rhythm.      Pulses: Normal pulses.      Heart sounds: Normal heart sounds. " "  Pulmonary:      Effort: Pulmonary effort is normal.      Breath sounds: Normal breath sounds.   Musculoskeletal:         General: Normal range of motion.      Cervical back: Normal range of motion and neck supple. No rigidity.   Skin:     General: Skin is warm and dry.      Capillary Refill: Capillary refill takes less than 2 seconds.   Neurological:      General: No focal deficit present.      Mental Status: He is alert and oriented to person, place, and time.   Psychiatric:         Mood and Affect: Mood normal.         Behavior: Behavior normal. Behavior is cooperative.         Thought Content: Thought content normal.         Judgment: Judgment normal.            Mallampati Class: I.  Tonsillar Stage: 2  visible at pillars.    All Labs Personally Reviewed               Data: All pertinent previous laboratory data reviewed     Recent Labs   Lab Test 01/30/24  1018 03/13/23  0743    139   POTASSIUM 4.7 4.4   CHLORIDE 103 102   CO2 27 26   ANIONGAP 10 11   GLC 97 119*   BUN 15.0 18.6   CR 0.79 0.88   NUNO 9.4 9.6       Recent Labs   Lab Test 01/30/24  1018   WBC 6.7   RBC 4.28*   HGB 14.7   HCT 42.9      MCH 34.3*   MCHC 34.3   RDW 12.9          Recent Labs   Lab Test 01/30/24  1018   PROTTOTAL 7.6   ALBUMIN 4.3   BILITOTAL 0.3   ALKPHOS 74   AST 46*   ALT 55       TSH (uIU/mL)   Date Value   01/30/2024 2.40       No results found for: \"UAMP\", \"UBARB\", \"BENZODIAZEUR\", \"UCANN\", \"UCOC\", \"OPIT\", \"UPCP\"    No results found for: \"IRONSAT\", \"[id number]\", \"CATHY\"    No results found for: \"PH\", \"PHARTERIAL\", \"PO2\", \"EO6AMJVKVRC\", \"SAT\", \"PCO2\", \"HCO3\", \"BASEEXCESS\", \"TONI\", \"BEB\"    @LABRCNTIPR(phv:4,pco2v:4,po2v:4,hco3v:4,jaswant:4,o2per:4)@    Echocardiology: No results found for this or any previous visit (from the past 4320 hour(s)).    Chest x-ray: No results found for this or any previous visit from the past 365 days.      Chest CT: No results found for this or any previous visit from the past 365 " days.      PFT: Most Recent [facility]ze Pulmonary Function Testing        SANJU Smith CNP 7/29/2024   Sleep Medicine    This note was written with the assistance of the Dragon voice-dictation technology software. The final document, although reviewed, may contain errors. For corrections, please contact the office.

## 2024-08-12 ENCOUNTER — TELEPHONE (OUTPATIENT)
Dept: FAMILY MEDICINE | Facility: CLINIC | Age: 59
End: 2024-08-12
Payer: COMMERCIAL

## 2024-08-12 NOTE — TELEPHONE ENCOUNTER
Called and spoke with patient, scheduled with Dr. Campos on 8/27 for weight follow up. Scheduled as video visit, per patient request. (Reports he will be in state of [place] still at this point).    Shakira Diaz RN

## 2024-08-12 NOTE — TELEPHONE ENCOUNTER
Reason for Call:  Appointment Request    Patient requesting this type of appt:  OV weight mgmt    Requested provider:  Dr. Julianna Campos    Reason patient unable to be scheduled: Not within requested timeframe    When does patient want to be seen/preferred time: 1-2 weeks, If possible next week.    Comments: [place] and there is no template for the provider.    Could we send this information to you in [employer] or would you prefer to receive a phone call?:   Patient would like to be contacted via [employer], please text patient that there is a message in [employer].    Call taken on 8/12/2024 at 10:13 AM by Mckayla Ugalde

## 2024-08-22 ASSESSMENT — ASTHMA QUESTIONNAIRES
ACT_TOTALSCORE: 25
QUESTION_2 LAST FOUR WEEKS HOW OFTEN HAVE YOU HAD SHORTNESS OF BREATH: NOT AT ALL
QUESTION_1 LAST FOUR WEEKS HOW MUCH OF THE TIME DID YOUR ASTHMA KEEP YOU FROM GETTING AS MUCH DONE AT WORK, SCHOOL OR AT HOME: NONE OF THE TIME
QUESTION_5 LAST FOUR WEEKS HOW WOULD YOU RATE YOUR ASTHMA CONTROL: COMPLETELY CONTROLLED
QUESTION_4 LAST FOUR WEEKS HOW OFTEN HAVE YOU USED YOUR RESCUE INHALER OR NEBULIZER MEDICATION (SUCH AS ALBUTEROL): NOT AT ALL
QUESTION_3 LAST FOUR WEEKS HOW OFTEN DID YOUR ASTHMA SYMPTOMS (WHEEZING, COUGHING, SHORTNESS OF BREATH, CHEST TIGHTNESS OR PAIN) WAKE YOU UP AT NIGHT OR EARLIER THAN USUAL IN THE MORNING: NOT AT ALL
ACT_TOTALSCORE: 25

## 2024-08-27 ENCOUNTER — VIRTUAL VISIT (OUTPATIENT)
Dept: FAMILY MEDICINE | Facility: CLINIC | Age: 59
End: 2024-08-27
Payer: COMMERCIAL

## 2024-08-27 DIAGNOSIS — I10 ESSENTIAL HYPERTENSION: ICD-10-CM

## 2024-08-27 DIAGNOSIS — E78.5 HYPERLIPIDEMIA, UNSPECIFIED HYPERLIPIDEMIA TYPE: Chronic | ICD-10-CM

## 2024-08-27 DIAGNOSIS — R73.03 PREDIABETES: ICD-10-CM

## 2024-08-27 DIAGNOSIS — E66.811 CLASS 1 OBESITY WITH SERIOUS COMORBIDITY AND BODY MASS INDEX (BMI) OF 33.0 TO 33.9 IN ADULT, UNSPECIFIED OBESITY TYPE: Primary | ICD-10-CM

## 2024-08-27 DIAGNOSIS — I21.9 MYOCARDIAL INFARCTION, UNSPECIFIED MI TYPE, UNSPECIFIED ARTERY (H): ICD-10-CM

## 2024-08-27 PROCEDURE — 99214 OFFICE O/P EST MOD 30 MIN: CPT | Mod: 95 | Performed by: FAMILY MEDICINE

## 2024-08-27 PROCEDURE — G2211 COMPLEX E/M VISIT ADD ON: HCPCS | Mod: 95 | Performed by: FAMILY MEDICINE

## 2024-08-27 NOTE — PROGRESS NOTES
Ulices is a 59 year old who is being evaluated via a billable video visit.          1. Class 1 obesity with serious comorbidity and body mass index (BMI) of 33.0 to 33.9 in adult, unspecified obesity type  Ulices is following up virtually with comprehensive weight management.  He continues on the Zepbound and it is working well for him.  He just bumped up to the 10 mg and is not having any adverse side effects.  He is now lost 25 pounds since he started in April.  He would like to continue on it for now.  Will follow-up in 12 weeks time.  He will let me know when he needs a refill if he wants to bump up to the 15 mg.  - tirzepatide-Weight Management (ZEPBOUND) 12.5 MG/0.5ML prefilled pen; Inject 0.5 mLs (12.5 mg) subcutaneously every 7 days.  Dispense: 2 mL; Refill: 0    2. Essential hypertension  Under good control with current medication.    3. Hyperlipidemia, unspecified hyperlipidemia type  On a statin.    4. Prediabetes  History of prediabetes.  Will recheck at some point when he is in person.    5. Myocardial infarction, unspecified MI type, unspecified artery (H)  Stable no chest pains      Subjective   Ulices is a 59 year old, presenting for the following health issues:  Weight Loss      8/27/2024     6:48 AM   Additional Questions   Roomed by      History of Present Illness       Reason for visit:  Weight Control follow up.    He eats 0-1 servings of fruits and vegetables daily.He consumes 0 sweetened beverage(s) daily.He exercises with enough effort to increase his heart rate 20 to 29 minutes per day.  He exercises with enough effort to increase his heart rate 3 or less days per week.   He is taking medications regularly.   Ulices is following up virtually for comprehensive weight management.  His intake weight in March was 250.  He had actually already lost 10 pounds so his weight maximum was 260.  When I saw him in May he is down to 243 and today he is down to 235.  He just did 1 injection now of the increased  dose of Zepbound at 10 mg.  He is tolerating it well without any adverse side effects.  He stays active, often hiking or walking.  He did set up a sleep study and he will do this in September.  He will be taking a trip to [place] as well.  His initial goal was 220 but now he feels like he wants to get under 200.  We will see where he is at when we see him in follow-up if he is ready to transition to maintenance.                Objective           Vitals:  No vitals were obtained today due to virtual visit.    Physical Exam   GENERAL: alert and no distress  PSYCH: Appropriate affect, tone, and pace of words          Video-Visit Details    Type of service:  Video Visit   Originating Location (pt. Location): Home    Distant Location (provider location):  On-site  Platform used for Video Visit: Rashid  Signed Electronically by: Julianna Campos MD

## 2024-10-02 ENCOUNTER — MYC MEDICAL ADVICE (OUTPATIENT)
Dept: FAMILY MEDICINE | Facility: CLINIC | Age: 59
End: 2024-10-02
Payer: COMMERCIAL

## 2024-10-02 DIAGNOSIS — E66.811 CLASS 1 OBESITY WITH SERIOUS COMORBIDITY AND BODY MASS INDEX (BMI) OF 33.0 TO 33.9 IN ADULT, UNSPECIFIED OBESITY TYPE: Primary | ICD-10-CM

## 2024-10-02 ASSESSMENT — SLEEP AND FATIGUE QUESTIONNAIRES
HOW LIKELY ARE YOU TO NOD OFF OR FALL ASLEEP IN A CAR, WHILE STOPPED FOR A FEW MINUTES IN TRAFFIC: WOULD NEVER DOZE
HOW LIKELY ARE YOU TO NOD OFF OR FALL ASLEEP WHILE SITTING AND TALKING TO SOMEONE: WOULD NEVER DOZE
HOW LIKELY ARE YOU TO NOD OFF OR FALL ASLEEP WHEN YOU ARE A PASSENGER IN A CAR FOR AN HOUR WITHOUT A BREAK: WOULD NEVER DOZE
HOW LIKELY ARE YOU TO NOD OFF OR FALL ASLEEP WHILE LYING DOWN TO REST IN THE AFTERNOON WHEN CIRCUMSTANCES PERMIT: HIGH CHANCE OF DOZING
HOW LIKELY ARE YOU TO NOD OFF OR FALL ASLEEP WHILE SITTING QUIETLY AFTER LUNCH WITHOUT ALCOHOL: WOULD NEVER DOZE
HOW LIKELY ARE YOU TO NOD OFF OR FALL ASLEEP WHILE SITTING AND READING: SLIGHT CHANCE OF DOZING
HOW LIKELY ARE YOU TO NOD OFF OR FALL ASLEEP WHILE WATCHING TV: MODERATE CHANCE OF DOZING
HOW LIKELY ARE YOU TO NOD OFF OR FALL ASLEEP WHILE SITTING INACTIVE IN A PUBLIC PLACE: WOULD NEVER DOZE

## 2024-10-03 ENCOUNTER — TELEPHONE (OUTPATIENT)
Dept: FAMILY MEDICINE | Facility: CLINIC | Age: 59
End: 2024-10-03
Payer: COMMERCIAL

## 2024-10-03 NOTE — TELEPHONE ENCOUNTER
Please initial PA.   tirzepatide-Weight Management (ZEPBOUND) 15 MG/0.5ML prefilled pen 6 mL 0 10/3/2024 -- No   Sig - Route: Inject 0.5 mLs (15 mg) subcutaneously every 7 days. - Subcutaneous   Sent to pharmacy as: Tirzepatide-Weight Management 15 MG/0.5ML Subcutaneous Solution Auto-injector (ZEPBOUND)   Class: E-Prescribe   Order: [id number]     lass 1 obesity with serious comorbidity and body mass index (BMI) of 33.0 to 33.9 in adult, unspecified obesity type [E66.811, Z68.33]  - Primary

## 2024-10-07 ENCOUNTER — OFFICE VISIT (OUTPATIENT)
Dept: SLEEP MEDICINE | Facility: CLINIC | Age: 59
End: 2024-10-07
Payer: COMMERCIAL

## 2024-10-07 DIAGNOSIS — R73.03 PREDIABETES: ICD-10-CM

## 2024-10-07 DIAGNOSIS — E66.811 CLASS 1 OBESITY DUE TO EXCESS CALORIES WITH SERIOUS COMORBIDITY AND BODY MASS INDEX (BMI) OF 33.0 TO 33.9 IN ADULT: ICD-10-CM

## 2024-10-07 DIAGNOSIS — R06.83 SNORING: ICD-10-CM

## 2024-10-07 DIAGNOSIS — E66.09 CLASS 1 OBESITY DUE TO EXCESS CALORIES WITH SERIOUS COMORBIDITY AND BODY MASS INDEX (BMI) OF 33.0 TO 33.9 IN ADULT: ICD-10-CM

## 2024-10-07 DIAGNOSIS — R29.818 SUSPECTED SLEEP APNEA: ICD-10-CM

## 2024-10-07 DIAGNOSIS — I10 ESSENTIAL HYPERTENSION: ICD-10-CM

## 2024-10-07 PROCEDURE — G0399 HOME SLEEP TEST/TYPE 3 PORTA: HCPCS | Performed by: INTERNAL MEDICINE

## 2024-10-07 NOTE — TELEPHONE ENCOUNTER
Central Prior Authorization Team   Phone: [phone]    PA Initiation    Medication: tirzepatide-Weight Management (ZEPBOUND) 15 MG/0.5ML prefilled pen  Insurance Company: [employer] Non-Specialty PA's - Phone [phone] Fax [phone]  Pharmacy Filling the Rx: [id number] - [place], [place] - [facility]  Filling Pharmacy Phone: [phone]  Filling Pharmacy Fax:    Start Date: 10/7/2024

## 2024-10-07 NOTE — PROGRESS NOTES
Pt is completing a home sleep test. Pt was instructed on how to put on the Noxturnal T3 device and associated equipment before going to bed and given the opportunity to practice putting it on before leaving the sleep center. Pt was reminded to bring the home sleep test kit back to the center tomorrow, at the scheduled time for download and reporting. Patient was instructed to complete study using the following treatment?  None  Neck circumference: 42 CM / 16.5 inches.  Device number: 3  Namita Crawford , Home Sleep Studies Specialist  [place]  / [facility]

## 2024-10-08 ENCOUNTER — DOCUMENTATION ONLY (OUTPATIENT)
Dept: SLEEP MEDICINE | Facility: CLINIC | Age: 59
End: 2024-10-08
Payer: COMMERCIAL

## 2024-10-08 NOTE — TELEPHONE ENCOUNTER
Prior Authorization Not Needed per Insurance    Medication: tirzepatide-Weight Management (ZEPBOUND) 15 MG/0.5ML prefilled pen  Insurance Company: [employer] Non-Specialty PA's - Phone [phone] Fax [phone]  Expected CoPay:      Pharmacy Filling the Rx: [id number] [place] [address] [address]    Patient has a current PA on file that is not .  Insurance will cover one month at a retail pharmacy.

## 2024-10-10 NOTE — PROGRESS NOTES
This HSAT was performed using a Noxturnal T3 device which recorded snore, sound, movement activity, body position, nasal pressure, oronasal thermal airflow, pulse, oximetry and both chest and abdominal respiratory effort. HSAT data was restricted to the time patient states they were in bed.     HSAT was scored using 1B 4% hypopnea rule.     AHI: 3.2  Snoring was reported as loud.  Time with SpO2 below 89% was 16.6 minutes.   Overall signal quality was good     Pt will follow up with sleep provider to determine appropriate therapy.     Ordering Provider, Cait Sanchez APRN CNP C. Oyugi, BA, RPSGT, RST System Clinical Specialist/ 10/10/2024

## 2024-10-11 NOTE — PROCEDURES
"HOME SLEEP STUDY INTERPRETATION    Patient: Ulices Gilbert  MRN: [id number]  YOB: 1965  Study Date: 10/7/2024  Referring Provider: Nanette Espinosa PA-C  Ordering Provider: Cait BILLS CNP     Indications for Home Study: Ulices Gilbert is a 59 year old male who presents with symptoms suggestive of obstructive sleep apnea.    Estimated body mass index is 33.69 kg/m  as calculated from the following:    Height as of 7/29/24: 1.816 m (5' 11.5\").    Weight as of 7/29/24: 111.1 kg (245 lb).  Total score - Kinder: 6 (10/2/2024  9:12 AM)  Total Score: 8 (10/2/2024  9:12 AM)    Data: A full night home sleep study was performed recording the standard physiologic parameters including body position, movement, sound, nasal pressure, thermal oral airflow, chest and abdominal movements with respiratory inductance plethysmography, and oxygen saturation by pulse oximetry. Pulse rate was estimated by oximetry recording. This study was considered adequate based on > 4 hours of quality oximetry and respiratory recording. As specified by the AASM Manual for the Scoring of Sleep and Associated events, version 2.3, Rule VIII.D 1B, 4% oxygen desaturation scoring for hypopneas is used as a standard of care on all home sleep apnea testing.    Analysis Time:  480 minutes    Respiration:   Sleep Associated Hypoxemia: sustained hypoxemia was present. Baseline oxygen saturation was 91%.  Time with saturation less than or equal to 89% was 16.6 minutes. The lowest oxygen saturation was 86%.   Snoring: Snoring was present.  Respiratory events: The home study revealed a presence of 0 obstructive apneas and 0 mixed and central apneas. There were 26 hypopneas resulting in a combined apnea/hypopnea index [AHI] of 3.2 events per hour.  AHI was 2 per hour supine, N/A per hour prone, 7.6 per hour on left side, and 1.5 per hour on right side.   Pattern: Excluding events noted above, respiratory rate and pattern was " Normal.    Position: Percent of time spent: supine - 12.6%, prone - 0%, on left - 28.1%, on right - 59.3%.    Heart Rate: By pulse oximetry normal rate was noted.     Assessment:   Patient did not rule in for obstructive sleep apnea.  Sleep associated hypoxemia was present.    Recommendations:  Consider  in lab NPSG .  Suggest optimizing sleep hygiene and avoiding sleep deprivation.  Weight management.    Diagnosis Code(s): Hypoxemia G47.36, Snoring R06.83    Maicol Wilson DO, October 11, 2024   Diplomate, American Board of Internal Medicine, Sleep Medicine

## 2024-10-25 ENCOUNTER — MYC MEDICAL ADVICE (OUTPATIENT)
Dept: FAMILY MEDICINE | Facility: CLINIC | Age: 59
End: 2024-10-25
Payer: COMMERCIAL

## 2024-10-28 NOTE — TELEPHONE ENCOUNTER
Per call to insurance as of 10/01/2024 patient's insurance has decided to no longer cover anti-obesity medications.  Insurance rep states that the patient should have received a letter in the mail from insurance prior to this change. Patient would need to contact the benefit coordinator at place of employment and ask why they chose to stop covering these medications.

## 2024-10-28 NOTE — TELEPHONE ENCOUNTER
Patient is having trouble filling this script. I talked with [facility] today and they are reporting no PA on file.    Please help :)    Toñito Ahmadi RN     [facility]'

## 2024-10-28 NOTE — TELEPHONE ENCOUNTER
Patient updated on this change via mychart encounter. 10/25/24.    Toñito Ahmadi RN     [facility]

## 2024-11-19 ENCOUNTER — VIRTUAL VISIT (OUTPATIENT)
Dept: FAMILY MEDICINE | Facility: CLINIC | Age: 59
End: 2024-11-19
Payer: COMMERCIAL

## 2024-11-19 ENCOUNTER — TELEPHONE (OUTPATIENT)
Dept: FAMILY MEDICINE | Facility: CLINIC | Age: 59
End: 2024-11-19

## 2024-11-19 DIAGNOSIS — E78.5 HYPERLIPIDEMIA, UNSPECIFIED HYPERLIPIDEMIA TYPE: Chronic | ICD-10-CM

## 2024-11-19 DIAGNOSIS — R73.03 PREDIABETES: ICD-10-CM

## 2024-11-19 DIAGNOSIS — I21.9 MYOCARDIAL INFARCTION, UNSPECIFIED MI TYPE, UNSPECIFIED ARTERY (H): ICD-10-CM

## 2024-11-19 DIAGNOSIS — I10 ESSENTIAL HYPERTENSION: ICD-10-CM

## 2024-11-19 DIAGNOSIS — E66.811 CLASS 1 OBESITY WITH SERIOUS COMORBIDITY AND BODY MASS INDEX (BMI) OF 33.0 TO 33.9 IN ADULT, UNSPECIFIED OBESITY TYPE: Primary | ICD-10-CM

## 2024-11-19 PROCEDURE — G2211 COMPLEX E/M VISIT ADD ON: HCPCS | Mod: 95 | Performed by: FAMILY MEDICINE

## 2024-11-19 PROCEDURE — 99214 OFFICE O/P EST MOD 30 MIN: CPT | Mod: 95 | Performed by: FAMILY MEDICINE

## 2024-11-19 NOTE — PROGRESS NOTES
Ulices is a 59 year old who is being evaluated via a billable video visit.          1. Class 1 obesity with serious comorbidity and body mass index (BMI) of 33.0 to 33.9 in adult, unspecified obesity type (Primary)  Ulices presents virtually for comprehensive weight management follow-up.  He is now with the 15 mg dose of Zepbound and tolerating it well.  He is now lost a total of 26 pounds and has a new goal of trying to get down to about 200.  He has been watching his proteins and doing weight training at least twice a week.  He is paying cash for the Zepbound as his insurance stopped paying for it.  Will send in refills and follow-up in about 16 weeks.  - tirzepatide-Weight Management (ZEPBOUND) 15 MG/0.5ML prefilled pen; Inject 0.5 mLs (15 mg) subcutaneously every 7 days.  Dispense: 6 mL; Refill: 1    2. Prediabetes  He could have this drawn when he is in next.    3. Hyperlipidemia, unspecified hyperlipidemia type  Mildly elevated    4. Essential hypertension  Under good control.    5. Myocardial infarction, unspecified MI type, unspecified artery (H)  Managed by PCP.      Subjective   Ulices is a 59 year old, presenting for the following health issues:  Weight Loss      11/19/2024     8:50 AM   Additional Questions   Roomed by      History of Present Illness       Reason for visit:  Weight Management    He eats 2-3 servings of fruits and vegetables daily.He consumes 0 sweetened beverage(s) daily.He exercises with enough effort to increase his heart rate 10 to 19 minutes per day.  He exercises with enough effort to increase his heart rate 3 or less days per week.   He is taking medications regularly.     He is following up virtually for comprehensive weight management.  When I for started seeing him in March he weighed 250.  He had already lost 10 pounds so his maximum weight is 260.  He initially had Zepbound covered by his insurance and when I last saw him he was down to 235.  Then his insurance stopped covering it  so he has decided to cash pay.  His current weight today is 234.  He had been off the medication for about 3 weeks and noticed he was just hungry all the time.  His new goal weight is around 200.  He is doing weight training twice a week.  The longitudinal plan of care for the diagnosis(es)/condition(s) as documented were addressed during this visit. Due to the added complexity in care, I will continue to support Ulices in the subsequent management and with ongoing continuity of care.               Objective           Vitals:  No vitals were obtained today due to virtual visit.    Physical Exam   GENERAL: alert and no distress  PSYCH: Appropriate affect, tone, and pace of words          Video-Visit Details    Type of service:  Video Visit   Originating Location (pt. Location): Home    Distant Location (provider location):  On-site  Platform used for Video Visit: Rashid  Signed Electronically by: Julianna Campos MD

## 2024-11-19 NOTE — TELEPHONE ENCOUNTER
Retail Pharmacy Prior Authorization Team   Phone: [phone]    INSURANCE CLOSED OUT EPA REQUEST - DRUG NOT COVERED - PLAN EXCLUSION

## 2025-01-07 DIAGNOSIS — J45.30 MILD PERSISTENT ASTHMA WITHOUT COMPLICATION: ICD-10-CM

## 2025-01-29 SDOH — HEALTH STABILITY: PHYSICAL HEALTH: ON AVERAGE, HOW MANY MINUTES DO YOU ENGAGE IN EXERCISE AT THIS LEVEL?: 40 MIN

## 2025-01-29 SDOH — HEALTH STABILITY: PHYSICAL HEALTH: ON AVERAGE, HOW MANY DAYS PER WEEK DO YOU ENGAGE IN MODERATE TO STRENUOUS EXERCISE (LIKE A BRISK WALK)?: 2 DAYS

## 2025-01-29 ASSESSMENT — SOCIAL DETERMINANTS OF HEALTH (SDOH): HOW OFTEN DO YOU GET TOGETHER WITH FRIENDS OR RELATIVES?: TWICE A WEEK

## 2025-02-03 ENCOUNTER — OFFICE VISIT (OUTPATIENT)
Dept: FAMILY MEDICINE | Facility: CLINIC | Age: 60
End: 2025-02-03
Attending: PHYSICIAN ASSISTANT
Payer: COMMERCIAL

## 2025-02-03 VITALS
TEMPERATURE: 97.9 F | SYSTOLIC BLOOD PRESSURE: 114 MMHG | DIASTOLIC BLOOD PRESSURE: 71 MMHG | OXYGEN SATURATION: 97 % | HEIGHT: 71 IN | RESPIRATION RATE: 16 BRPM | BODY MASS INDEX: 32.42 KG/M2 | WEIGHT: 231.6 LBS | HEART RATE: 69 BPM

## 2025-02-03 DIAGNOSIS — E66.812 CLASS 2 SEVERE OBESITY DUE TO EXCESS CALORIES WITH SERIOUS COMORBIDITY IN ADULT, UNSPECIFIED BMI (H): ICD-10-CM

## 2025-02-03 DIAGNOSIS — T63.441D BEE STING REACTION, ACCIDENTAL OR UNINTENTIONAL, SUBSEQUENT ENCOUNTER: ICD-10-CM

## 2025-02-03 DIAGNOSIS — E78.5 HYPERLIPIDEMIA LDL GOAL <130: ICD-10-CM

## 2025-02-03 DIAGNOSIS — J45.30 MILD PERSISTENT ASTHMA WITHOUT COMPLICATION: ICD-10-CM

## 2025-02-03 DIAGNOSIS — D12.6 COLON ADENOMAS: ICD-10-CM

## 2025-02-03 DIAGNOSIS — I10 ESSENTIAL HYPERTENSION: ICD-10-CM

## 2025-02-03 DIAGNOSIS — Z12.5 SCREENING FOR PROSTATE CANCER: ICD-10-CM

## 2025-02-03 DIAGNOSIS — Z00.00 ROUTINE GENERAL MEDICAL EXAMINATION AT A HEALTH CARE FACILITY: Primary | ICD-10-CM

## 2025-02-03 DIAGNOSIS — E66.01 CLASS 2 SEVERE OBESITY DUE TO EXCESS CALORIES WITH SERIOUS COMORBIDITY IN ADULT, UNSPECIFIED BMI (H): ICD-10-CM

## 2025-02-03 LAB
ERYTHROCYTE [DISTWIDTH] IN BLOOD BY AUTOMATED COUNT: 12.2 % (ref 10–15)
EST. AVERAGE GLUCOSE BLD GHB EST-MCNC: 97 MG/DL
HBA1C MFR BLD: 5 % (ref 0–5.6)
HCT VFR BLD AUTO: 44.4 % (ref 40–53)
HGB BLD-MCNC: 15.1 G/DL (ref 13.3–17.7)
MCH RBC QN AUTO: 34 PG (ref 26.5–33)
MCHC RBC AUTO-ENTMCNC: 34 G/DL (ref 31.5–36.5)
MCV RBC AUTO: 100 FL (ref 78–100)
PLATELET # BLD AUTO: 186 10E3/UL (ref 150–450)
RBC # BLD AUTO: 4.44 10E6/UL (ref 4.4–5.9)
WBC # BLD AUTO: 7.2 10E3/UL (ref 4–11)

## 2025-02-03 RX ORDER — ALBUTEROL SULFATE 90 UG/1
2 INHALANT RESPIRATORY (INHALATION) EVERY 6 HOURS PRN
Qty: 18 G | Refills: 0 | Status: SHIPPED | OUTPATIENT
Start: 2025-02-03

## 2025-02-03 RX ORDER — LISINOPRIL 5 MG/1
TABLET ORAL
Qty: 90 TABLET | Refills: 3 | Status: SHIPPED | OUTPATIENT
Start: 2025-02-03

## 2025-02-03 RX ORDER — SIMVASTATIN 20 MG
TABLET ORAL
Qty: 135 TABLET | Refills: 3 | Status: SHIPPED | OUTPATIENT
Start: 2025-02-03

## 2025-02-03 NOTE — PROGRESS NOTES
Preventive Care Visit  [facility]  Nanette Epsinosa PA-C, Physician Assistant - Medical  Feb 3, 2025      Assessment & Plan     Routine general medical examination at a health care facility  Labs updated today.  Vaccines- UTD  Colonoscopy- UTD, due for repeat in 6/2025 due to multiple polyps.  Prostate cancer- PSA updated today.  - PRIMARY CARE FOLLOW-UP SCHEDULING  - CBC with platelets  - Comprehensive metabolic panel  - Hemoglobin A1c  - Lipid panel reflex to direct LDL Fasting  - TSH with free T4 reflex  - Adult Dermatology  Referral  - CBC with platelets  - Comprehensive metabolic panel  - Hemoglobin A1c  - Lipid panel reflex to direct LDL Fasting  - TSH with free T4 reflex    Screening for prostate cancer  PSA updated today.  - Prostate Specific Antigen Screen  - Prostate Specific Antigen Screen    Essential hypertension  Chronic issue, BP at goal, continue Lisinopril 5mg daily.   - lisinopril (ZESTRIL) 5 MG tablet  Dispense: 90 tablet; Refill: 3    Hyperlipidemia LDL goal <130  Chronic issue, lipid panel updated today.  Continue Zocor daily.  - simvastatin (ZOCOR) 20 MG tablet  Dispense: 135 tablet; Refill: 3    Class 2 severe obesity due to excess calories with serious comorbidity in adult, unspecified BMI (H)  Chronic issue, BMI improving on medication.  Continue to follow with Dr. Campos.    Colon adenomas  Patient followed by MALDONADO HOOKER, due for colonoscopy in 6/2025.    Bee sting reaction, accidental or unintentional, subsequent encounter  Chronic issue, stable.    Mild persistent asthma without complication  Chronic issue, stable, continue Asmanex daily.   - albuterol (PROAIR HFA/PROVENTIL HFA/VENTOLIN HFA) 108 (90 Base) MCG/ACT inhaler  Dispense: 18 g; Refill: 0      Patient has been advised of split billing requirements and indicates understanding: Yes        BMI  Estimated body mass index is 32.08 kg/m  as calculated from the following:    Height as of this  "encounter: 1.81 m (5' 11.25\").    Weight as of this encounter: 105.1 kg (231 lb 9.6 oz).       Counseling  Appropriate preventive services were addressed with this patient via screening, questionnaire, or discussion as appropriate for fall prevention, nutrition, physical activity, Tobacco-use cessation, social engagement, weight loss and cognition.  Checklist reviewing preventive services available has been given to the patient.  Reviewed patient's diet, addressing concerns and/or questions.   He is at risk for lack of exercise and has been provided with information to increase physical activity for the benefit of his well-being.   The patient was instructed to see the dentist every 6 months.       Risks, benefits and alternatives were discussed with patient. Agreeable to the plan of care.    The longitudinal plan of care for the diagnosis(es)/condition(s) as documented were addressed during this visit. Due to the added complexity in care, I will continue to support the patient in the subsequent management and ongoing continuity of care.      Hunter Elena is a 59 year old, presenting for the following:  Physical, Med check, Asthma, and Mole        2/3/2025     2:38 PM   Additional Questions   Roomed by DAYRON Dimas([facility])          HPI          Health Care Directive  Patient does not have a Health Care Directive: Discussed advance care planning with patient; however, patient declined at this time.      1/29/2025   General Health   How would you rate your overall physical health? Good   Feel stress (tense, anxious, or unable to sleep) Only a little   (!) STRESS CONCERN      1/29/2025   Nutrition   Three or more servings of calcium each day? (!) NO   Diet: Low salt    Carbohydrate counting   How many servings of fruit and vegetables per day? (!) 2-3   How many sweetened beverages each day? 0-1       Multiple values from one day are sorted in reverse-chronological order         1/29/2025   Exercise   Days per week " of moderate/strenous exercise 2 days   Average minutes spent exercising at this level 40 min   (!) EXERCISE CONCERN      1/29/2025   Social Factors   Frequency of gathering with friends or relatives Twice a week   Worry food won't last until get money to buy more No   Food not last or not have enough money for food? No   Do you have housing? (Housing is defined as stable permanent housing and does not include staying ouside in a car, in a tent, in an abandoned building, in an overnight shelter, or couch-surfing.) Yes   Are you worried about losing your housing? No   Lack of transportation? No   Unable to get utilities (heat,electricity)? No         1/29/2025   Fall Risk   Fallen 2 or more times in the past year? No   Trouble with walking or balance? No          1/29/2025   Dental   Dentist two times every year? (!) NO         1/29/2025   TB Screening   Were you born outside of the [place]? No         Today's PHQ-2 Score:       2/3/2025     2:31 PM   PHQ-2 ( 1999 [employer])   Q1: Little interest or pleasure in doing things 0   Q2: Feeling down, depressed or hopeless 0   PHQ-2 Score 0    Q1: Little interest or pleasure in doing things Not at all   Q2: Feeling down, depressed or hopeless Not at all   PHQ-2 Score 0       Patient-reported           1/29/2025   Substance Use   Alcohol more than 3/day or more than 7/wk No   Do you use any other substances recreationally? No     Social History     Tobacco Use    Smoking status: Never    Smokeless tobacco: Former     Quit date: 6/1/1996   Vaping Use    Vaping status: Never Used   Substance Use Topics    Alcohol use: Yes    Drug use: Never           1/29/2025   STI Screening   New sexual partner(s) since last STI/HIV test? No   Last PSA:   Prostate Specific Antigen Screen   Date Value Ref Range Status   01/30/2024 0.96 0.00 - 3.50 ng/mL Final   12/21/2021 0.79 0.00 - 3.50 ug/L Final     ASCVD Risk   The ASCVD Risk score (Massiel KINNEY, et al., 2019) failed to calculate for the  following reasons:    Risk score cannot be calculated because patient has a medical history suggesting prior/existing ASCVD         Reviewed and updated as needed this visit by Provider                    Patient Active Problem List   Diagnosis    Asthma    Blood In Urine    Hyperlipidemia    Myocardial infarction (H)    Colon adenomas    Essential hypertension    Vitamin D deficiency    Class 2 severe obesity due to excess calories with serious comorbidity in adult, unspecified BMI (H)    Prediabetes     Past Surgical History:   Procedure Laterality Date    ARTHROSCOPIC REPAIR ACL Left     CARDIAC SURGERY      Cardiac Cath- no stents    COLONOSCOPY  December    HERNIA REPAIR  when a child    SOFT TISSUE SURGERY  ACL - Knee       Social History     Tobacco Use    Smoking status: Never    Smokeless tobacco: Former     Quit date: 6/1/1996   Substance Use Topics    Alcohol use: Yes     Family History   Problem Relation Age of Onset    Hypertension Mother     Chronic Obstructive Pulmonary Disease Mother     Interstitial Lung Disease Father         hx of asbestos         Current Outpatient Medications   Medication Sig Dispense Refill    albuterol (PROAIR HFA/PROVENTIL HFA/VENTOLIN HFA) 108 (90 Base) MCG/ACT inhaler Inhale 2 puffs into the lungs every 6 hours as needed. 18 g 0    aspirin 81 MG EC tablet [ASPIRIN 81 MG EC TABLET] Take 1 tablet (81 mg total) by mouth daily.  0    calcium carbonate 500 mg, elemental, 1250 (500 Ca) MG tablet chewable       cholecalciferol, vitamin D3, (VITAMIN D3) 2,000 unit Tab [CHOLECALCIFEROL, VITAMIN D3, (VITAMIN D3) 2,000 UNIT TAB] Take 2 tablets (4,000 Units total) by mouth daily. AFTER 3 MONTHS RECHECK VITAMIN D LEVEL  0    EPINEPHrine (ANY BX GENERIC EQUIV) 0.3 MG/0.3ML injection 2-pack Inject 0.3 mLs (0.3 mg) into the muscle as needed for anaphylaxis 2 each 0    lisinopril (ZESTRIL) 5 MG tablet TAKE 1 TABLET (5 MG) BY MOUTH ONCE A DAY 90 tablet 3    Multiple Vitamins-Minerals  "(PRESERVISION AREDS PO)       simvastatin (ZOCOR) 20 MG tablet TAKE 1&1/2 TABLETS (30 MG) BY MOUTH ONCE DAILY 135 tablet 3    tirzepatide-Weight Management (ZEPBOUND) 15 MG/0.5ML prefilled pen Inject 0.5 mLs (15 mg) subcutaneously every 7 days. 6 mL 1     Allergies   Allergen Reactions    Venom-Honey Bee [Bee Venom] Anaphylaxis         Review of Systems  Constitutional, HEENT, cardiovascular, pulmonary, gi and gu systems are negative, except as otherwise noted.     Objective    Exam  /71   Pulse 69   Temp 97.9  F (36.6  C) (Oral)   Resp 16   Ht 1.81 m (5' 11.25\")   Wt 105.1 kg (231 lb 9.6 oz)   SpO2 97%   BMI 32.08 kg/m     Estimated body mass index is 32.08 kg/m  as calculated from the following:    Height as of this encounter: 1.81 m (5' 11.25\").    Weight as of this encounter: 105.1 kg (231 lb 9.6 oz).    Physical Exam  GENERAL: alert and no distress  EYES: Eyes grossly normal to inspection, PERRL and conjunctivae and sclerae normal  HENT: ear canals and TM's normal, nose and mouth without ulcers or lesions  NECK: no adenopathy, no asymmetry, masses, or scars  RESP: lungs clear to auscultation - no rales, rhonchi or wheezes  CV: regular rate and rhythm, normal S1 S2, no S3 or S4, no murmur, click or rub, no peripheral edema  ABDOMEN: soft, nontender, no hepatosplenomegaly, no masses and bowel sounds normal  MS: no gross musculoskeletal defects noted, no edema  SKIN: no suspicious lesions or rashes  NEURO: Normal strength and tone, mentation intact and speech normal  PSYCH: mentation appears normal, affect normal/bright        Signed Electronically by: Nanette Espinosa PA-C    "

## 2025-02-03 NOTE — PATIENT INSTRUCTIONS
Patient Education   Preventive Care Advice   This is general advice given by our system to help you stay healthy. However, your care team may have specific advice just for you. Please talk to your care team about your preventive care needs.  Nutrition  Eat 5 or more servings of fruits and vegetables each day.  Try wheat bread, brown rice and whole grain pasta (instead of white bread, rice, and pasta).  Get enough calcium and vitamin D. Check the label on foods and aim for 100% of the RDA (recommended daily allowance).  Lifestyle  Exercise at least 150 minutes each week  (30 minutes a day, 5 days a week).  Do muscle strengthening activities 2 days a week. These help control your weight and prevent disease.  No smoking.  Wear sunscreen to prevent skin cancer.  Have a dental exam and cleaning every 6 months.  Yearly exams  See your health care team every year to talk about:  Any changes in your health.  Any medicines your care team has prescribed.  Preventive care, family planning, and ways to prevent chronic diseases.  Shots (vaccines)   HPV shots (up to age 26), if you've never had them before.  Hepatitis B shots (up to age 59), if you've never had them before.  COVID-19 shot: Get this shot when it's due.  Flu shot: Get a flu shot every year.  Tetanus shot: Get a tetanus shot every 10 years.  Pneumococcal, hepatitis A, and RSV shots: Ask your care team if you need these based on your risk.  Shingles shot (for age 50 and up)  General health tests  Diabetes screening:  Starting at age 35, Get screened for diabetes at least every 3 years.  If you are younger than age 35, ask your care team if you should be screened for diabetes.  Cholesterol test: At age 39, start having a cholesterol test every 5 years, or more often if advised.  Bone density scan (DEXA): At age 50, ask your care team if you should have this scan for osteoporosis (brittle bones).  Hepatitis C: Get tested at least once in your life.  STIs (sexually  transmitted infections)  Before age 24: Ask your care team if you should be screened for STIs.  After age 24: Get screened for STIs if you're at risk. You are at risk for STIs (including HIV) if:  You are sexually active with more than one person.  You don't use condoms every time.  You or a partner was diagnosed with a sexually transmitted infection.  If you are at risk for HIV, ask about PrEP medicine to prevent HIV.  Get tested for HIV at least once in your life, whether you are at risk for HIV or not.  Cancer screening tests  Cervical cancer screening: If you have a cervix, begin getting regular cervical cancer screening tests starting at age 21.  Breast cancer scan (mammogram): If you've ever had breasts, begin having regular mammograms starting at age 40. This is a scan to check for breast cancer.  Colon cancer screening: It is important to start screening for colon cancer at age 45.  Have a colonoscopy test every 10 years (or more often if you're at risk) Or, ask your provider about stool tests like a FIT test every year or Cologuard test every 3 years.  To learn more about your testing options, visit:   .  For help making a decision, visit:   https://bit.ly/oe49713.  Prostate cancer screening test: If you have a prostate, ask your care team if a prostate cancer screening test (PSA) at age 55 is right for you.  Lung cancer screening: If you are a current or former smoker ages 50 to 80, ask your care team if ongoing lung cancer screenings are right for you.  For informational purposes only. Not to replace the advice of your health care provider. Copyright   2023 [employer]. All rights reserved. Clinically reviewed by the [facility] Transitions Program. [employer] 603955 - REV 01/24.

## 2025-02-04 LAB
ALBUMIN SERPL BCG-MCNC: 4.3 G/DL (ref 3.5–5.2)
ALP SERPL-CCNC: 61 U/L (ref 40–150)
ALT SERPL W P-5'-P-CCNC: 57 U/L (ref 0–70)
ANION GAP SERPL CALCULATED.3IONS-SCNC: 13 MMOL/L (ref 7–15)
AST SERPL W P-5'-P-CCNC: 41 U/L (ref 0–45)
BILIRUB SERPL-MCNC: 0.6 MG/DL
BUN SERPL-MCNC: 17.2 MG/DL (ref 8–23)
CALCIUM SERPL-MCNC: 10 MG/DL (ref 8.8–10.4)
CHLORIDE SERPL-SCNC: 102 MMOL/L (ref 98–107)
CHOLEST SERPL-MCNC: 170 MG/DL
CREAT SERPL-MCNC: 0.74 MG/DL (ref 0.67–1.17)
EGFRCR SERPLBLD CKD-EPI 2021: >90 ML/MIN/1.73M2
FASTING STATUS PATIENT QL REPORTED: NO
FASTING STATUS PATIENT QL REPORTED: NO
GLUCOSE SERPL-MCNC: 90 MG/DL (ref 70–99)
HCO3 SERPL-SCNC: 25 MMOL/L (ref 22–29)
HDLC SERPL-MCNC: 55 MG/DL
LDLC SERPL CALC-MCNC: 99 MG/DL
NONHDLC SERPL-MCNC: 115 MG/DL
POTASSIUM SERPL-SCNC: 3.9 MMOL/L (ref 3.4–5.3)
PROT SERPL-MCNC: 7.8 G/DL (ref 6.4–8.3)
PSA SERPL DL<=0.01 NG/ML-MCNC: 1.18 NG/ML (ref 0–3.5)
SODIUM SERPL-SCNC: 140 MMOL/L (ref 135–145)
TRIGL SERPL-MCNC: 79 MG/DL
TSH SERPL DL<=0.005 MIU/L-ACNC: 2.28 UIU/ML (ref 0.3–4.2)

## 2025-02-25 ENCOUNTER — MYC REFILL (OUTPATIENT)
Dept: FAMILY MEDICINE | Facility: CLINIC | Age: 60
End: 2025-02-25
Payer: COMMERCIAL

## 2025-02-25 DIAGNOSIS — E66.811 CLASS 1 OBESITY WITH SERIOUS COMORBIDITY AND BODY MASS INDEX (BMI) OF 33.0 TO 33.9 IN ADULT, UNSPECIFIED OBESITY TYPE: ICD-10-CM

## 2025-03-13 ASSESSMENT — ASTHMA QUESTIONNAIRES
QUESTION_1 LAST FOUR WEEKS HOW MUCH OF THE TIME DID YOUR ASTHMA KEEP YOU FROM GETTING AS MUCH DONE AT WORK, SCHOOL OR AT HOME: NONE OF THE TIME
QUESTION_2 LAST FOUR WEEKS HOW OFTEN HAVE YOU HAD SHORTNESS OF BREATH: NOT AT ALL
ACT_TOTALSCORE: 25
ACT_TOTALSCORE: 25
QUESTION_4 LAST FOUR WEEKS HOW OFTEN HAVE YOU USED YOUR RESCUE INHALER OR NEBULIZER MEDICATION (SUCH AS ALBUTEROL): NOT AT ALL
QUESTION_3 LAST FOUR WEEKS HOW OFTEN DID YOUR ASTHMA SYMPTOMS (WHEEZING, COUGHING, SHORTNESS OF BREATH, CHEST TIGHTNESS OR PAIN) WAKE YOU UP AT NIGHT OR EARLIER THAN USUAL IN THE MORNING: NOT AT ALL
QUESTION_5 LAST FOUR WEEKS HOW WOULD YOU RATE YOUR ASTHMA CONTROL: COMPLETELY CONTROLLED

## 2025-03-18 ENCOUNTER — PATIENT OUTREACH (OUTPATIENT)
Dept: GASTROENTEROLOGY | Facility: CLINIC | Age: 60
End: 2025-03-18

## 2025-03-18 ENCOUNTER — VIRTUAL VISIT (OUTPATIENT)
Dept: FAMILY MEDICINE | Facility: CLINIC | Age: 60
End: 2025-03-18
Payer: COMMERCIAL

## 2025-03-18 DIAGNOSIS — I10 ESSENTIAL HYPERTENSION: ICD-10-CM

## 2025-03-18 DIAGNOSIS — E66.811 CLASS 1 OBESITY WITH SERIOUS COMORBIDITY AND BODY MASS INDEX (BMI) OF 32.0 TO 32.9 IN ADULT, UNSPECIFIED OBESITY TYPE: Primary | ICD-10-CM

## 2025-03-18 DIAGNOSIS — R73.03 PREDIABETES: ICD-10-CM

## 2025-03-18 DIAGNOSIS — Z12.11 SPECIAL SCREENING FOR MALIGNANT NEOPLASMS, COLON: Primary | ICD-10-CM

## 2025-03-18 DIAGNOSIS — E78.5 HYPERLIPIDEMIA, UNSPECIFIED HYPERLIPIDEMIA TYPE: Chronic | ICD-10-CM

## 2025-03-18 DIAGNOSIS — I21.9 MYOCARDIAL INFARCTION, UNSPECIFIED MI TYPE, UNSPECIFIED ARTERY (H): ICD-10-CM

## 2025-03-18 PROCEDURE — 98006 SYNCH AUDIO-VIDEO EST MOD 30: CPT | Performed by: FAMILY MEDICINE

## 2025-03-18 NOTE — PROGRESS NOTES
"Patient has a history of polyps and surveillance colonoscopy is due June 2025. Patient meets criteria for CRC high risk standing order protocol.    CRC Screening Colonoscopy Referral Review    Patient meets the inclusion criteria for screening colonoscopy standing order.    Ordering/Referring Provider:  Nanette Espinosa PA-C    BMI: Estimated body mass index is 32.08 kg/m  as calculated from the following:    Height as of 2/3/25: 1.81 m (5' 11.25\").    Weight as of 2/3/25: 105.1 kg (231 lb 9.6 oz).     Sedation:  Does patient have any of the following conditions affecting sedation?  No medical conditions affecting sedation.    Previous Scopes:  Any previous recommendations or follow up needs based on previous scope?  Prep recommendations: extended prep    Medical Concerns to Postpone Order:  Does patient have any of the following medical concerns that should postpone/delay colonoscopy referral?  No medical conditions affecting colonoscopy referral.    Final Referral Details:  Based on patient's medical history patient is appropriate for referral order with moderate sedation. If patient's BMI > 50 do not schedule in [facility].  "

## 2025-03-18 NOTE — PROGRESS NOTES
Ulices is a 59 year old who is being evaluated via a billable video visit.    How would you like to obtain your AVS? MyChart  If the video visit is dropped, the invitation should be resent by:   Will anyone else be joining your video visit? No      1. Class 1 obesity with serious comorbidity and body mass index (BMI) of 32.0 to 32.9 in adult, unspecified obesity type (Primary)  Ulices presents virtually to follow-up on comprehensive weight management.  He is now lost a total of 35 pounds with lifestyle change and Zepbound.  He is pleased with the progress although it seems to be slowing down.  He is rechanged his school now to try to get under 200 pounds.  He is doing quite a bit of exercise and strength training.  He is cash paying using a coupon.  He will let me know when he needs refills.  Otherwise follow-up in 6 months.    2. Essential hypertension  Well-controlled with current medication    3. Hyperlipidemia, unspecified hyperlipidemia type  On a statin.    4. Myocardial infarction, unspecified MI type, unspecified artery (H)  Currently asymptomatic.    5. Prediabetes  Most recent A1c is 5.0      Subjective   Ulices is a 59 year old, presenting for the following health issues:  Weight Loss        3/18/2025     7:17 AM   Additional Questions   Roomed by      History of Present Illness       Reason for visit:  Weight Control Follow Up    He eats 2-3 servings of fruits and vegetables daily.He consumes 0 sweetened beverage(s) daily.He exercises with enough effort to increase his heart rate 30 to 60 minutes per day.  He exercises with enough effort to increase his heart rate 3 or less days per week.   He is taking medications regularly.      He is following up virtually for comprehensive weight management.  I started seeing him about a year ago in March 2024 with an intake weight of 250.  He had already lost 10 pounds prior to seeing me.  He had decided to start Zepbound and is cash pay.  When I saw him November he was  down to 234 and today he is down to 224.  He is now lost about 10% with the Zepbound.  His goal is to get under 200 pounds.  He feels like it has been really helpful in controlling his appetite and cravings.  He does admit it has slowed down a bit.  He is good with exercise and strength training.  The longitudinal plan of care for the diagnosis(es)/condition(s) as documented were addressed during this visit. Due to the added complexity in care, I will continue to support Ulices in the subsequent management and with ongoing continuity of care.               Objective    Vitals - Patient Reported  Weight (Patient Reported): 101.6 kg (224 lb)      Vitals:  No vitals were obtained today due to virtual visit.    Physical Exam   GENERAL: alert and no distress  PSYCH: Appropriate affect, tone, and pace of words          Video-Visit Details    Type of service:  Video Visit   Originating Location (pt. Location): Home    Distant Location (provider location):  On-site  Platform used for Video Visit: Rashid  Signed Electronically by: Julianna Campos MD

## 2025-03-20 ENCOUNTER — TELEPHONE (OUTPATIENT)
Dept: GASTROENTEROLOGY | Facility: CLINIC | Age: 60
End: 2025-03-20
Payer: COMMERCIAL

## 2025-03-20 NOTE — TELEPHONE ENCOUNTER
"Endoscopy Scheduling Screen    Have you had any respiratory illness or flu-like symptoms in the last 10 days?  No    What is your communication preference for Instructions and/or Bowel Prep?   MyChart    What insurance is in the chart?  Other:  [employer]     Ordering/Referring Provider: MARICARMEN THRASHER    (If ordering provider performs procedure, schedule with ordering provider unless otherwise instructed. )    BMI: Estimated body mass index is 32.08 kg/m  as calculated from the following:    Height as of 2/3/25: 1.81 m (5' 11.25\").    Weight as of 2/3/25: 105.1 kg (231 lb 9.6 oz).     Sedation Ordered  moderate sedation.   If patient BMI > 50 do not schedule in [facility].    If patient BMI > 45 do not schedule at [facility].    Are you taking methadone or Suboxone?  NO, No RN review required.    Have you been diagnosed and are being treated for severe PTSD or severe anxiety?  NO, No RN review required.    Are you taking any prescription medications for pain 3 or more times per week?   NO, No RN review required.    Do you have a history of malignant hyperthermia?  No    (Females) Are you currently pregnant?   No     Have you been diagnosed or told you have pulmonary hypertension?   No    Do you have an LVAD?  No    Have you been told you have moderate to severe sleep apnea?  No.    Have you been told you have COPD, asthma, or any other lung disease?  Yes     What breathing problems do you have?  Asthma     Do you use home oxygen?  No    Have your breathing problems required an ED visit or hospitalization in the last year?  No.    Has your doctor ordered any cardiac tests like echo, angiogram, stress test, ablation, or EKG, that you have not completed yet?  No    Do you  have a history of any heart conditions?  No     Have you ever had or are you waiting for an organ transplant?  No. Continue scheduling, no site restrictions.    Have you had a stroke or transient ischemic attack (TIA aka \"mini stroke\") in the last 2 " "years?   No.    Have you been diagnosed with or been told you have cirrhosis of the liver?   No.    Are you currently on dialysis?   No    Do you need assistance transferring?   No    BMI: Estimated body mass index is 32.08 kg/m  as calculated from the following:    Height as of 2/3/25: 1.81 m (5' 11.25\").    Weight as of 2/3/25: 105.1 kg (231 lb 9.6 oz).     Is patients BMI > 40 and scheduling location [facility]?  No    Do you take an injectable or oral medication for weight loss or diabetes (excluding insulin)?  Yes, hold time can be up to 7 days. Please consult with you prescribing provider to discuss endoscopy recommendations. (Please schedule at least 7 days out.)    Do you take the medication Naltrexone?  No    Do you take blood thinners?  No       Prep   Are you currently on dialysis or do you have chronic kidney disease?  No    Do you have a diagnosis of diabetes?  No    Do you have a diagnosis of cystic fibrosis (CF)?  No    On a regular basis do you go 3 -5 days between bowel movements?  No    BMI > 40?  No    Preferred Pharmacy:    [id number] - [place], [place] - [address]  [address]  [place] [address]  Phone: [phone] Fax: [phone]        Final Scheduling Details     Procedure scheduled  Colonoscopy      **Per pt -- wanted [facility]       "

## 2025-04-30 ENCOUNTER — TELEPHONE (OUTPATIENT)
Dept: FAMILY MEDICINE | Facility: CLINIC | Age: 60
End: 2025-04-30

## 2025-05-13 ENCOUNTER — MYC MEDICAL ADVICE (OUTPATIENT)
Dept: SLEEP MEDICINE | Facility: CLINIC | Age: 60
End: 2025-05-13
Payer: COMMERCIAL

## 2025-05-18 DIAGNOSIS — E66.811 CLASS 1 OBESITY WITH SERIOUS COMORBIDITY AND BODY MASS INDEX (BMI) OF 33.0 TO 33.9 IN ADULT, UNSPECIFIED OBESITY TYPE: ICD-10-CM

## 2025-05-19 RX ORDER — TIRZEPATIDE 15 MG/.5ML
INJECTION, SOLUTION SUBCUTANEOUS
Qty: 6 ML | Refills: 0 | Status: SHIPPED | OUTPATIENT
Start: 2025-05-19

## 2025-07-21 ENCOUNTER — TRANSFERRED RECORDS (OUTPATIENT)
Dept: ADMINISTRATIVE | Facility: CLINIC | Age: 60
End: 2025-07-21
Payer: COMMERCIAL

## 2025-07-23 NOTE — PROCEDURES
[facility]   [address] [place] [address], [address], [place] [address]     Patient Name: Ulices Gilbert  Gender:  Male  Exam Date: 07/21/2025 Visit Number:  [id number]  Age: 60 Years YOB: 1965  Attending MD: Samia Brooks MD Medical Record#:  [id number]    Procedure: Colonoscopy   Indications: Previous advanced adenomatous polyp(s)      Referring MD: Referral Self  Primary MD:      Nanette GONZALEZ  Medications: Admitting Medications:   0.9% Normal Saline at TKO   Intra Procedure Medications:   Patient received monitored anesthesia care.     Complications: No immediate complications  ______________________________________________________________________________  Procedure:   An examination of the heart and lungs was performed and found to be within acceptable limits.  .  The patient was therefore deemed a reasonable candidate for endoscopy and sedation.   The risks and benefits of the procedure were explained to the patient.After obtaining informed consent, the patient received monitored anesthesia care and I passed the scope   without difficulty via the rectum to the ileum.  The appendiceal orifice and ic valve were identified.  The scope was retroflexed during the examination  The quality of the prep was good  (Miralax/Gatorade Double Prep).    This was a complete examination throughout the entire colon.    Findings:    Normal finding.  Location - ileum.    Polyp location: sigmoid.  Quantity: 1.  Size: 2 mm.  Polyp shape:  sessile.         Maneuver: polypectomy was performed with a cold biopsy forceps.       Removal:  complete.  Retrieval: complete.  Bleeding: none.    Diverticulosis.  Location: - transverse colon - sigmoid.    Description:  moderate.    Size:  medium.    Quantity:  several.  No inflammation present.    Hemorrhoids.  External hemorrhoids without bleeding.    Impression:  Colorectal polyp detected on colonoscopy  Diverticulosis of colon without  diverticulitis  Hemorrhoids, external    Preliminary Plan:  Repeat colonoscopy in 3 years  Pathology Results:  A: COLON, SIGMOID, POLYP:           1. Normal colonic mucosa (clinically, 1 polyp)           2. Negative for serrated change, dysplasia, and malignancy      MICROSCOPIC  A: Performed     Electronically signed by: Richy Martinez MD    Interpreted at [facility], [address] [place] [id number]    Orders    Diagnostics:  Procedure Comments Timeframe Assessment   Colonoscopy Double prep 3 Years K63.5     Instruction(s)/Education:  Instruction/Education Timeframe Assessment   Colon Cancer Prevention  K63.5   Colon Polyps  K63.5   Diverticulosis/Diverticulitis  K57.30   Hemorrhoids (External)  K64.4   High Fiber Diet  K57.30       Final Plan:  Repeat colonoscopy in 3 years.    We will attempt to contact you at appropriate intervals via [place] mail.  We may not be able to find you or contact you at that time, therefore you should know that the responsibility for following our recommendation rests with you.  If you don't hear from us at the time your procedure is due, please contact our office to schedule an appointment.  If your contact information should change, please contact our office so that we can update your record.      _Electronically signed by:___________________  Samia Brooks MD                 07/21/2025    cc: Nanette GONZALEZ  cc: Nanette GONZALEZ

## 2025-08-25 ENCOUNTER — MYC MEDICAL ADVICE (OUTPATIENT)
Dept: FAMILY MEDICINE | Facility: CLINIC | Age: 60
End: 2025-08-25
Payer: COMMERCIAL

## 2025-08-25 DIAGNOSIS — E66.811 CLASS 1 OBESITY WITH SERIOUS COMORBIDITY AND BODY MASS INDEX (BMI) OF 33.0 TO 33.9 IN ADULT, UNSPECIFIED OBESITY TYPE: ICD-10-CM

## 2025-08-26 RX ORDER — TIRZEPATIDE 15 MG/.5ML
INJECTION, SOLUTION SUBCUTANEOUS
Qty: 6 ML | Refills: 0 | OUTPATIENT
Start: 2025-08-26